# Patient Record
Sex: MALE | Race: WHITE | NOT HISPANIC OR LATINO
[De-identification: names, ages, dates, MRNs, and addresses within clinical notes are randomized per-mention and may not be internally consistent; named-entity substitution may affect disease eponyms.]

---

## 2017-01-11 ENCOUNTER — NON-APPOINTMENT (OUTPATIENT)
Age: 67
End: 2017-01-11

## 2017-01-11 ENCOUNTER — APPOINTMENT (OUTPATIENT)
Dept: CARDIOLOGY | Facility: CLINIC | Age: 67
End: 2017-01-11

## 2017-01-11 VITALS
DIASTOLIC BLOOD PRESSURE: 66 MMHG | WEIGHT: 142 LBS | HEIGHT: 66 IN | SYSTOLIC BLOOD PRESSURE: 177 MMHG | BODY MASS INDEX: 22.82 KG/M2

## 2017-01-11 DIAGNOSIS — I27.2 OTHER SECONDARY PULMONARY HYPERTENSION: ICD-10-CM

## 2017-01-11 DIAGNOSIS — Z87.19 PERSONAL HISTORY OF OTHER DISEASES OF THE DIGESTIVE SYSTEM: ICD-10-CM

## 2017-03-02 ENCOUNTER — INPATIENT (INPATIENT)
Facility: HOSPITAL | Age: 67
LOS: 4 days | Discharge: HOME | DRG: 682 | End: 2017-03-07
Attending: FAMILY MEDICINE | Admitting: FAMILY MEDICINE
Payer: MEDICARE

## 2017-03-02 VITALS
SYSTOLIC BLOOD PRESSURE: 222 MMHG | HEART RATE: 74 BPM | TEMPERATURE: 98 F | DIASTOLIC BLOOD PRESSURE: 78 MMHG | OXYGEN SATURATION: 100 % | RESPIRATION RATE: 16 BRPM | WEIGHT: 139.99 LBS

## 2017-03-02 DIAGNOSIS — Z98.89 OTHER SPECIFIED POSTPROCEDURAL STATES: Chronic | ICD-10-CM

## 2017-03-02 DIAGNOSIS — N17.9 ACUTE KIDNEY FAILURE, UNSPECIFIED: ICD-10-CM

## 2017-03-02 DIAGNOSIS — I10 ESSENTIAL (PRIMARY) HYPERTENSION: ICD-10-CM

## 2017-03-02 DIAGNOSIS — D64.9 ANEMIA, UNSPECIFIED: ICD-10-CM

## 2017-03-02 LAB
ALBUMIN SERPL ELPH-MCNC: 3.3 G/DL — SIGNIFICANT CHANGE UP (ref 3.3–5)
ALP SERPL-CCNC: 46 U/L — SIGNIFICANT CHANGE UP (ref 40–120)
ALT FLD-CCNC: 35 U/L — SIGNIFICANT CHANGE UP (ref 12–78)
ANION GAP SERPL CALC-SCNC: 14 MMOL/L — SIGNIFICANT CHANGE UP (ref 5–17)
APTT BLD: 24.3 SEC — LOW (ref 27.5–37.4)
AST SERPL-CCNC: 21 U/L — SIGNIFICANT CHANGE UP (ref 15–37)
BASOPHILS # BLD AUTO: 0 K/UL — SIGNIFICANT CHANGE UP (ref 0–0.2)
BASOPHILS NFR BLD AUTO: 0.4 % — SIGNIFICANT CHANGE UP (ref 0–2)
BILIRUB SERPL-MCNC: 0.3 MG/DL — SIGNIFICANT CHANGE UP (ref 0.2–1.2)
BUN SERPL-MCNC: 93 MG/DL — HIGH (ref 7–23)
CALCIUM SERPL-MCNC: 7.5 MG/DL — LOW (ref 8.5–10.1)
CHLORIDE SERPL-SCNC: 117 MMOL/L — HIGH (ref 96–108)
CO2 SERPL-SCNC: 13 MMOL/L — LOW (ref 22–31)
CREAT SERPL-MCNC: 5.1 MG/DL — HIGH (ref 0.5–1.3)
EOSINOPHIL # BLD AUTO: 0 K/UL — SIGNIFICANT CHANGE UP (ref 0–0.5)
EOSINOPHIL NFR BLD AUTO: 0 % — SIGNIFICANT CHANGE UP (ref 0–6)
FERRITIN SERPL-MCNC: 231.6 NG/ML — SIGNIFICANT CHANGE UP (ref 30–400)
GLUCOSE SERPL-MCNC: 106 MG/DL — HIGH (ref 70–99)
HBA1C BLD-MCNC: 5.5 % — SIGNIFICANT CHANGE UP (ref 4–5.6)
HCT VFR BLD CALC: 27.4 % — LOW (ref 39–50)
HGB BLD-MCNC: 8.8 G/DL — LOW (ref 13–17)
INR BLD: 0.9 RATIO — SIGNIFICANT CHANGE UP (ref 0.88–1.16)
IRON SATN MFR SERPL: 42 % — SIGNIFICANT CHANGE UP (ref 16–55)
IRON SATN MFR SERPL: 83 UG/DL — SIGNIFICANT CHANGE UP (ref 45–165)
LYMPHOCYTES # BLD AUTO: 1.5 K/UL — SIGNIFICANT CHANGE UP (ref 1–3.3)
LYMPHOCYTES # BLD AUTO: 13.2 % — SIGNIFICANT CHANGE UP (ref 13–44)
MCHC RBC-ENTMCNC: 30.5 PG — SIGNIFICANT CHANGE UP (ref 27–34)
MCHC RBC-ENTMCNC: 32.2 GM/DL — SIGNIFICANT CHANGE UP (ref 32–36)
MCV RBC AUTO: 95 FL — SIGNIFICANT CHANGE UP (ref 80–100)
MONOCYTES # BLD AUTO: 1 K/UL — HIGH (ref 0–0.9)
MONOCYTES NFR BLD AUTO: 8.6 % — SIGNIFICANT CHANGE UP (ref 1–9)
NEUTROPHILS # BLD AUTO: 8.6 K/UL — HIGH (ref 1.8–7.4)
NEUTROPHILS NFR BLD AUTO: 77.9 % — HIGH (ref 43–77)
PLATELET # BLD AUTO: 168 K/UL — SIGNIFICANT CHANGE UP (ref 150–400)
POTASSIUM SERPL-MCNC: 5.1 MMOL/L — SIGNIFICANT CHANGE UP (ref 3.5–5.3)
POTASSIUM SERPL-SCNC: 5.1 MMOL/L — SIGNIFICANT CHANGE UP (ref 3.5–5.3)
PROT SERPL-MCNC: 6.4 G/DL — SIGNIFICANT CHANGE UP (ref 6–8.3)
PROTHROM AB SERPL-ACNC: 10 SEC — SIGNIFICANT CHANGE UP (ref 10–13.1)
RBC # BLD: 2.89 M/UL — LOW (ref 4.2–5.8)
RBC # FLD: 15.5 % — HIGH (ref 10.3–14.5)
SODIUM SERPL-SCNC: 144 MMOL/L — SIGNIFICANT CHANGE UP (ref 135–145)
T3 SERPL-MCNC: 44 NG/DL — LOW (ref 80–200)
T4 AB SER-ACNC: 4.6 UG/DL — SIGNIFICANT CHANGE UP (ref 4.6–12)
TIBC SERPL-MCNC: 200 UG/DL — LOW (ref 220–430)
TSH SERPL-MCNC: 0.73 UIU/ML — SIGNIFICANT CHANGE UP (ref 0.36–3.74)
TSH SERPL-MCNC: 1.18 UIU/ML — SIGNIFICANT CHANGE UP (ref 0.36–3.74)
UIBC SERPL-MCNC: 117 UG/DL — SIGNIFICANT CHANGE UP (ref 110–370)
WBC # BLD: 11.1 K/UL — HIGH (ref 3.8–10.5)
WBC # FLD AUTO: 11.1 K/UL — HIGH (ref 3.8–10.5)

## 2017-03-02 PROCEDURE — 71010: CPT | Mod: 26

## 2017-03-02 PROCEDURE — 36556 INSERT NON-TUNNEL CV CATH: CPT

## 2017-03-02 PROCEDURE — 93010 ELECTROCARDIOGRAM REPORT: CPT

## 2017-03-02 PROCEDURE — 77001 FLUOROGUIDE FOR VEIN DEVICE: CPT | Mod: 26

## 2017-03-02 PROCEDURE — 99284 EMERGENCY DEPT VISIT MOD MDM: CPT

## 2017-03-02 PROCEDURE — 76937 US GUIDE VASCULAR ACCESS: CPT | Mod: 26

## 2017-03-02 RX ORDER — DOCUSATE SODIUM 100 MG
100 CAPSULE ORAL THREE TIMES A DAY
Qty: 0 | Refills: 0 | Status: DISCONTINUED | OUTPATIENT
Start: 2017-03-02 | End: 2017-03-07

## 2017-03-02 RX ORDER — CARVEDILOL PHOSPHATE 80 MG/1
6.25 CAPSULE, EXTENDED RELEASE ORAL EVERY 12 HOURS
Qty: 0 | Refills: 0 | Status: DISCONTINUED | OUTPATIENT
Start: 2017-03-02 | End: 2017-03-07

## 2017-03-02 RX ORDER — ERYTHROPOIETIN 10000 [IU]/ML
10000 INJECTION, SOLUTION INTRAVENOUS; SUBCUTANEOUS
Qty: 0 | Refills: 0 | Status: DISCONTINUED | OUTPATIENT
Start: 2017-03-02 | End: 2017-03-07

## 2017-03-02 RX ORDER — FOLIC ACID 0.8 MG
1 TABLET ORAL DAILY
Qty: 0 | Refills: 0 | Status: DISCONTINUED | OUTPATIENT
Start: 2017-03-02 | End: 2017-03-07

## 2017-03-02 RX ORDER — ALBUTEROL 90 UG/1
2 AEROSOL, METERED ORAL EVERY 6 HOURS
Qty: 0 | Refills: 0 | Status: DISCONTINUED | OUTPATIENT
Start: 2017-03-02 | End: 2017-03-07

## 2017-03-02 RX ORDER — LOSARTAN POTASSIUM 100 MG/1
25 TABLET, FILM COATED ORAL DAILY
Qty: 0 | Refills: 0 | Status: DISCONTINUED | OUTPATIENT
Start: 2017-03-02 | End: 2017-03-03

## 2017-03-02 RX ORDER — FERROUS SULFATE 325(65) MG
325 TABLET ORAL DAILY
Qty: 0 | Refills: 0 | Status: DISCONTINUED | OUTPATIENT
Start: 2017-03-02 | End: 2017-03-07

## 2017-03-02 RX ORDER — FLUTICASONE PROPIONATE AND SALMETEROL 50; 250 UG/1; UG/1
1 POWDER ORAL; RESPIRATORY (INHALATION)
Qty: 0 | Refills: 0 | Status: DISCONTINUED | OUTPATIENT
Start: 2017-03-02 | End: 2017-03-07

## 2017-03-02 RX ORDER — HYDRALAZINE HCL 50 MG
25 TABLET ORAL EVERY 8 HOURS
Qty: 0 | Refills: 0 | Status: DISCONTINUED | OUTPATIENT
Start: 2017-03-02 | End: 2017-03-06

## 2017-03-02 RX ORDER — CALCIUM ACETATE 667 MG
1334 TABLET ORAL
Qty: 0 | Refills: 0 | Status: DISCONTINUED | OUTPATIENT
Start: 2017-03-02 | End: 2017-03-07

## 2017-03-02 RX ORDER — PANTOPRAZOLE SODIUM 20 MG/1
40 TABLET, DELAYED RELEASE ORAL
Qty: 0 | Refills: 0 | Status: DISCONTINUED | OUTPATIENT
Start: 2017-03-02 | End: 2017-03-07

## 2017-03-02 RX ORDER — SIMVASTATIN 20 MG/1
20 TABLET, FILM COATED ORAL AT BEDTIME
Qty: 0 | Refills: 0 | Status: DISCONTINUED | OUTPATIENT
Start: 2017-03-02 | End: 2017-03-07

## 2017-03-02 RX ORDER — LOSARTAN POTASSIUM 100 MG/1
25 TABLET, FILM COATED ORAL DAILY
Qty: 0 | Refills: 0 | Status: DISCONTINUED | OUTPATIENT
Start: 2017-03-02 | End: 2017-03-02

## 2017-03-02 RX ORDER — HYDRALAZINE HCL 50 MG
10 TABLET ORAL EVERY 6 HOURS
Qty: 0 | Refills: 0 | Status: DISCONTINUED | OUTPATIENT
Start: 2017-03-02 | End: 2017-03-07

## 2017-03-02 RX ORDER — HYDRALAZINE HCL 50 MG
100 TABLET ORAL ONCE
Qty: 0 | Refills: 0 | Status: COMPLETED | OUTPATIENT
Start: 2017-03-02 | End: 2017-03-02

## 2017-03-02 RX ORDER — THIAMINE MONONITRATE (VIT B1) 100 MG
100 TABLET ORAL DAILY
Qty: 0 | Refills: 0 | Status: DISCONTINUED | OUTPATIENT
Start: 2017-03-02 | End: 2017-03-07

## 2017-03-02 RX ORDER — ISOSORBIDE MONONITRATE 60 MG/1
30 TABLET, EXTENDED RELEASE ORAL ONCE
Qty: 0 | Refills: 0 | Status: COMPLETED | OUTPATIENT
Start: 2017-03-02 | End: 2017-03-02

## 2017-03-02 RX ADMIN — Medication 0.3 MILLIGRAM(S): at 18:32

## 2017-03-02 RX ADMIN — Medication 100 MILLIGRAM(S): at 09:26

## 2017-03-02 RX ADMIN — SIMVASTATIN 20 MILLIGRAM(S): 20 TABLET, FILM COATED ORAL at 21:11

## 2017-03-02 RX ADMIN — Medication 0.3 MILLIGRAM(S): at 09:26

## 2017-03-02 RX ADMIN — ISOSORBIDE MONONITRATE 30 MILLIGRAM(S): 60 TABLET, EXTENDED RELEASE ORAL at 09:26

## 2017-03-02 RX ADMIN — Medication 25 MILLIGRAM(S): at 17:04

## 2017-03-02 RX ADMIN — FLUTICASONE PROPIONATE AND SALMETEROL 1 DOSE(S): 50; 250 POWDER ORAL; RESPIRATORY (INHALATION) at 21:12

## 2017-03-02 RX ADMIN — CARVEDILOL PHOSPHATE 6.25 MILLIGRAM(S): 80 CAPSULE, EXTENDED RELEASE ORAL at 17:01

## 2017-03-02 NOTE — H&P ADULT. - PMH
CAD (coronary artery disease)    COPD (chronic obstructive pulmonary disease)    GERD (gastroesophageal reflux disease)    HTN (hypertension)    Nicotine addiction    Pancreatitis    Renal failure  with dialysis in the past for Acute RF  Suicidal overdose  Anti-freeze - 7 years ago Anemia    CAD (coronary artery disease)    Constipation    COPD (chronic obstructive pulmonary disease)    Dizziness    GERD (gastroesophageal reflux disease)    HTN (hypertension)    Hyperlipidemia    Nicotine addiction    Pancreatitis    Renal failure  with dialysis in the past for Acute RF  Suicidal overdose  Anti-freeze - 7 years ago

## 2017-03-02 NOTE — DIETITIAN INITIAL EVALUATION ADULT. - OTHER INFO
Pt A+O at visit; wife at bedside. Dx Acute renal failure on CKD. Plan for HD catheter placement today. NPO. Pta follow low Na, low K, low phos diet pta. Fair appetite/intake pta. Wt stable (135-140lbs). No GI issues at present. +BM 3/2.

## 2017-03-02 NOTE — ED ADULT NURSE NOTE - OBJECTIVE STATEMENT
Presents to ER for elevated BUN/Creatinine. Pt has a history of renal failure, was on HD years ago. As per wife at bedside, states pt's creatinine is usually around 2-3, and Dr Dominguez has been monitoring it for years.  Pt had recent blood work which showed a BUN of 109 and a Creatinine of 5.83. Presents to ER for elevated BUN/Creatinine. Pt has a history of renal failure, was on HD years ago. As per wife at bedside, states pt's creatinine is usually around 2-3, and Dr Dominguez has been monitoring it for years.  Pt had recent blood work which showed a BUN of 109 and a Creatinine of 5.83.  Pt states he still voids.

## 2017-03-02 NOTE — ED ADULT NURSE NOTE - PMH
CAD (coronary artery disease)    COPD (chronic obstructive pulmonary disease)    GERD (gastroesophageal reflux disease)    HTN (hypertension)    Nicotine addiction    Pancreatitis    Renal failure  with dialysis in the past for Acute RF  Suicidal overdose  Anti-freeze - 7 years ago

## 2017-03-02 NOTE — DIETITIAN INITIAL EVALUATION ADULT. - NUTRITION INTERVENTION
Vitamin/Meals and Snack/Nutrition - Related Medication Management/Nutrition Education/Medical Food Supplements

## 2017-03-02 NOTE — ED PROVIDER NOTE - OBJECTIVE STATEMENT
67 y/o m with hx of HTN, HLD, COPD, LAD stent, dialysis 2006 x 4 mos sent in by Nephrologist, Dr. Kan, for elevated BUN/Cr and possible Dialysis.  pt states he has been fatigued with recent PNA.  Pt denies any fevers, chills, chest pain, SOB. BUN/Cr 109/5.83  PCP: Penelope  cardio: Ruthy

## 2017-03-02 NOTE — ED ADULT NURSE REASSESSMENT NOTE - NS ED NURSE REASSESS COMMENT FT1
Call placed to Dr Obando regarding STAT occult blood stool sample.  Dr Hernadez aware of order, states pt does not need guaiac. Awaiting callback.

## 2017-03-02 NOTE — H&P ADULT. - HISTORY OF PRESENT ILLNESS
presented to er after patient was seen by renal for worsening creatinine and sent in for HD.  Patient has been feeling tired lately.  Denies any other associated symptoms.  History of similar previous episodes and had HD.  Patient is being admitted for HD catheter placement.

## 2017-03-02 NOTE — PATIENT PROFILE ADULT. - EXTENSIONS OF SELF_ADULT
Dentures/Eyeglasses/Hearing Aid Dentures/Wheel Chair/Eyeglasses/Hearing Aid Hearing Aid/Eyeglasses/Dentures

## 2017-03-02 NOTE — PATIENT PROFILE ADULT. - ABILITY TO HEAR (WITH HEARING AID OR HEARING APPLIANCE IF NORMALLY USED):
[place]/Mildly to Moderately Impaired: difficulty hearing in some environments or speaker may need to increase volume or speak distinctly Adequate: hears normal conversation without difficulty/[place]

## 2017-03-02 NOTE — DIETITIAN INITIAL EVALUATION ADULT. - NS AS NUTRI INTERV ED CONTENT
Priority modifications/Purpose of the nutrition education/Written and verbal instructions provided on above diet. Pt/pts wife with good understanding. RDs name/phone number left with patient if questions/concerns arise.

## 2017-03-03 LAB
ANION GAP SERPL CALC-SCNC: 12 MMOL/L — SIGNIFICANT CHANGE UP (ref 5–17)
BLD GP AB SCN SERPL QL: SIGNIFICANT CHANGE UP
BUN SERPL-MCNC: 51 MG/DL — HIGH (ref 7–23)
CALCIUM SERPL-MCNC: 7.4 MG/DL — LOW (ref 8.5–10.1)
CHLORIDE SERPL-SCNC: 107 MMOL/L — SIGNIFICANT CHANGE UP (ref 96–108)
CO2 SERPL-SCNC: 23 MMOL/L — SIGNIFICANT CHANGE UP (ref 22–31)
CREAT SERPL-MCNC: 3.7 MG/DL — HIGH (ref 0.5–1.3)
GLUCOSE SERPL-MCNC: 144 MG/DL — HIGH (ref 70–99)
HBV CORE AB SER-ACNC: SIGNIFICANT CHANGE UP
HBV SURFACE AB SER-ACNC: <3 MIU/ML — LOW
HBV SURFACE AB SER-ACNC: SIGNIFICANT CHANGE UP
HBV SURFACE AG SER-ACNC: SIGNIFICANT CHANGE UP
HCT VFR BLD CALC: 23.2 % — LOW (ref 39–50)
HCV AB S/CO SERPL IA: 0.21 S/CO — SIGNIFICANT CHANGE UP
HCV AB SERPL-IMP: SIGNIFICANT CHANGE UP
HGB BLD-MCNC: 7.6 G/DL — LOW (ref 13–17)
MCHC RBC-ENTMCNC: 30.5 PG — SIGNIFICANT CHANGE UP (ref 27–34)
MCHC RBC-ENTMCNC: 32.7 GM/DL — SIGNIFICANT CHANGE UP (ref 32–36)
MCV RBC AUTO: 93.3 FL — SIGNIFICANT CHANGE UP (ref 80–100)
OB PNL STL: POSITIVE
PLATELET # BLD AUTO: 122 K/UL — LOW (ref 150–400)
POTASSIUM SERPL-MCNC: 3.9 MMOL/L — SIGNIFICANT CHANGE UP (ref 3.5–5.3)
POTASSIUM SERPL-SCNC: 3.9 MMOL/L — SIGNIFICANT CHANGE UP (ref 3.5–5.3)
RBC # BLD: 2.48 M/UL — LOW (ref 4.2–5.8)
RBC # FLD: 15.2 % — HIGH (ref 10.3–14.5)
SODIUM SERPL-SCNC: 142 MMOL/L — SIGNIFICANT CHANGE UP (ref 135–145)
WBC # BLD: 8.5 K/UL — SIGNIFICANT CHANGE UP (ref 3.8–10.5)
WBC # FLD AUTO: 8.5 K/UL — SIGNIFICANT CHANGE UP (ref 3.8–10.5)

## 2017-03-03 RX ORDER — ERYTHROPOIETIN 10000 [IU]/ML
10000 INJECTION, SOLUTION INTRAVENOUS; SUBCUTANEOUS ONCE
Qty: 0 | Refills: 0 | Status: COMPLETED | OUTPATIENT
Start: 2017-03-03 | End: 2017-03-03

## 2017-03-03 RX ORDER — LOSARTAN POTASSIUM 100 MG/1
50 TABLET, FILM COATED ORAL DAILY
Qty: 0 | Refills: 0 | Status: DISCONTINUED | OUTPATIENT
Start: 2017-03-03 | End: 2017-03-06

## 2017-03-03 RX ADMIN — SIMVASTATIN 20 MILLIGRAM(S): 20 TABLET, FILM COATED ORAL at 22:04

## 2017-03-03 RX ADMIN — Medication 0.3 MILLIGRAM(S): at 14:04

## 2017-03-03 RX ADMIN — Medication 25 MILLIGRAM(S): at 22:04

## 2017-03-03 RX ADMIN — Medication 0.3 MILLIGRAM(S): at 08:46

## 2017-03-03 RX ADMIN — FLUTICASONE PROPIONATE AND SALMETEROL 1 DOSE(S): 50; 250 POWDER ORAL; RESPIRATORY (INHALATION) at 19:16

## 2017-03-03 RX ADMIN — Medication 1334 MILLIGRAM(S): at 19:16

## 2017-03-03 RX ADMIN — Medication 25 MILLIGRAM(S): at 06:37

## 2017-03-03 RX ADMIN — Medication 0.3 MILLIGRAM(S): at 00:42

## 2017-03-03 RX ADMIN — Medication 325 MILLIGRAM(S): at 12:23

## 2017-03-03 RX ADMIN — LOSARTAN POTASSIUM 50 MILLIGRAM(S): 100 TABLET, FILM COATED ORAL at 08:47

## 2017-03-03 RX ADMIN — Medication 0.3 MILLIGRAM(S): at 22:04

## 2017-03-03 RX ADMIN — Medication 100 MILLIGRAM(S): at 12:22

## 2017-03-03 RX ADMIN — Medication 1334 MILLIGRAM(S): at 12:22

## 2017-03-03 RX ADMIN — ERYTHROPOIETIN 10000 UNIT(S): 10000 INJECTION, SOLUTION INTRAVENOUS; SUBCUTANEOUS at 17:13

## 2017-03-03 RX ADMIN — Medication 100 MILLIGRAM(S): at 14:04

## 2017-03-03 RX ADMIN — Medication 1 MILLIGRAM(S): at 12:23

## 2017-03-03 RX ADMIN — PANTOPRAZOLE SODIUM 40 MILLIGRAM(S): 20 TABLET, DELAYED RELEASE ORAL at 06:37

## 2017-03-03 RX ADMIN — Medication 25 MILLIGRAM(S): at 14:04

## 2017-03-03 RX ADMIN — CARVEDILOL PHOSPHATE 6.25 MILLIGRAM(S): 80 CAPSULE, EXTENDED RELEASE ORAL at 19:16

## 2017-03-03 RX ADMIN — Medication 100 MILLIGRAM(S): at 22:04

## 2017-03-03 RX ADMIN — Medication 1334 MILLIGRAM(S): at 08:45

## 2017-03-03 NOTE — GOALS OF CARE CONVERSATION - PERSONAL ADVANCE DIRECTIVE - CONVERSATION DETAILS
met pt, has no hcp, form reviewed, simple lay terms, cpr dnr dni sonia, pt has not had conversation w family, wife is his hcp and will show form to her, available to further discuss. contact # given. at present pt is full code.

## 2017-03-04 ENCOUNTER — TRANSCRIPTION ENCOUNTER (OUTPATIENT)
Age: 67
End: 2017-03-04

## 2017-03-04 LAB
ALBUMIN SERPL ELPH-MCNC: 2.3 G/DL — LOW (ref 3.3–5)
ANION GAP SERPL CALC-SCNC: 10 MMOL/L — SIGNIFICANT CHANGE UP (ref 5–17)
BUN SERPL-MCNC: 28 MG/DL — HIGH (ref 7–23)
CALCIUM SERPL-MCNC: 7.2 MG/DL — LOW (ref 8.5–10.1)
CHLORIDE SERPL-SCNC: 102 MMOL/L — SIGNIFICANT CHANGE UP (ref 96–108)
CO2 SERPL-SCNC: 27 MMOL/L — SIGNIFICANT CHANGE UP (ref 22–31)
CREAT SERPL-MCNC: 3 MG/DL — HIGH (ref 0.5–1.3)
GLUCOSE SERPL-MCNC: 154 MG/DL — HIGH (ref 70–99)
HCT VFR BLD CALC: 23.4 % — LOW (ref 39–50)
HGB BLD-MCNC: 7.7 G/DL — LOW (ref 13–17)
MCHC RBC-ENTMCNC: 30 PG — SIGNIFICANT CHANGE UP (ref 27–34)
MCHC RBC-ENTMCNC: 33 GM/DL — SIGNIFICANT CHANGE UP (ref 32–36)
MCV RBC AUTO: 91 FL — SIGNIFICANT CHANGE UP (ref 80–100)
PHOSPHATE SERPL-MCNC: 2.1 MG/DL — LOW (ref 2.5–4.5)
PLATELET # BLD AUTO: 111 K/UL — LOW (ref 150–400)
POTASSIUM SERPL-MCNC: 3.8 MMOL/L — SIGNIFICANT CHANGE UP (ref 3.5–5.3)
POTASSIUM SERPL-SCNC: 3.8 MMOL/L — SIGNIFICANT CHANGE UP (ref 3.5–5.3)
RBC # BLD: 2.57 M/UL — LOW (ref 4.2–5.8)
RBC # FLD: 14.8 % — HIGH (ref 10.3–14.5)
SODIUM SERPL-SCNC: 139 MMOL/L — SIGNIFICANT CHANGE UP (ref 135–145)
WBC # BLD: 10.6 K/UL — HIGH (ref 3.8–10.5)
WBC # FLD AUTO: 10.6 K/UL — HIGH (ref 3.8–10.5)

## 2017-03-04 RX ADMIN — Medication 1334 MILLIGRAM(S): at 17:51

## 2017-03-04 RX ADMIN — Medication 25 MILLIGRAM(S): at 06:24

## 2017-03-04 RX ADMIN — FLUTICASONE PROPIONATE AND SALMETEROL 1 DOSE(S): 50; 250 POWDER ORAL; RESPIRATORY (INHALATION) at 08:43

## 2017-03-04 RX ADMIN — Medication 100 MILLIGRAM(S): at 14:34

## 2017-03-04 RX ADMIN — FLUTICASONE PROPIONATE AND SALMETEROL 1 DOSE(S): 50; 250 POWDER ORAL; RESPIRATORY (INHALATION) at 17:52

## 2017-03-04 RX ADMIN — ERYTHROPOIETIN 10000 UNIT(S): 10000 INJECTION, SOLUTION INTRAVENOUS; SUBCUTANEOUS at 11:13

## 2017-03-04 RX ADMIN — Medication 325 MILLIGRAM(S): at 14:34

## 2017-03-04 RX ADMIN — SIMVASTATIN 20 MILLIGRAM(S): 20 TABLET, FILM COATED ORAL at 21:22

## 2017-03-04 RX ADMIN — Medication 1 MILLIGRAM(S): at 14:34

## 2017-03-04 RX ADMIN — Medication 100 MILLIGRAM(S): at 21:19

## 2017-03-04 RX ADMIN — Medication 0.3 MILLIGRAM(S): at 06:23

## 2017-03-04 RX ADMIN — LOSARTAN POTASSIUM 50 MILLIGRAM(S): 100 TABLET, FILM COATED ORAL at 06:23

## 2017-03-04 RX ADMIN — CARVEDILOL PHOSPHATE 6.25 MILLIGRAM(S): 80 CAPSULE, EXTENDED RELEASE ORAL at 06:23

## 2017-03-04 RX ADMIN — PANTOPRAZOLE SODIUM 40 MILLIGRAM(S): 20 TABLET, DELAYED RELEASE ORAL at 06:23

## 2017-03-04 RX ADMIN — Medication 25 MILLIGRAM(S): at 14:34

## 2017-03-04 RX ADMIN — Medication 25 MILLIGRAM(S): at 21:19

## 2017-03-04 RX ADMIN — Medication 0.3 MILLIGRAM(S): at 14:34

## 2017-03-04 RX ADMIN — Medication 0.3 MILLIGRAM(S): at 21:19

## 2017-03-04 RX ADMIN — Medication 1334 MILLIGRAM(S): at 08:43

## 2017-03-04 RX ADMIN — CARVEDILOL PHOSPHATE 6.25 MILLIGRAM(S): 80 CAPSULE, EXTENDED RELEASE ORAL at 17:51

## 2017-03-04 RX ADMIN — Medication 100 MILLIGRAM(S): at 06:23

## 2017-03-04 NOTE — DISCHARGE NOTE ADULT - CARE PROVIDER_API CALL
Reese Clayton (DO), Medicine  [address]  [place] [address]  Phone: [phone]  Fax: [phone]    Prudencio Ware (MD), Medicine  [address]  [place] [address]  Phone: [phone]  Fax: [phone]    Jakub Cheung (MD), Cardiovascular Disease  [address] [place] [address]  Phone: [phone]  Fax: [phone]    Walt Jauregui (), Gastroenterology; Internal Medicine  [address] [place] [address]  Phone: [phone]  Fax: [phone]

## 2017-03-04 NOTE — DISCHARGE NOTE ADULT - MEDICATION SUMMARY - MEDICATIONS TO STOP TAKING
I will STOP taking the medications listed below when I get home from the hospital:    Plavix 75 mg oral tablet  -- 1 tab(s) by mouth once a day - Hospital    lactobacillus acidophilus - oral tablet  -- 1 tab(s) by mouth once a day - hospital    thiamine 100 mg oral tablet  -- 1 tab(s) by mouth once a day - hospital    Toprol-XL 50 mg oral tablet, extended release  -- 1 tab(s) by mouth once a day    Norvasc 5 mg oral tablet  -- 1 tab(s) by mouth every 12 hours    Innerclean oral tablet  -- 2 tab(s) by mouth once a day (at bedtime)    Antivert 12.5 mg oral tablet  -- 1 tab(s) by mouth 3 times a day  -- May cause drowsiness.  Alcohol may intensify this effect.  Use care when operating dangerous machinery.    Norvasc 10 mg oral tablet  -- 1 tab(s) by mouth once a day    plavix  -- 75 milligram(s) by mouth once a day

## 2017-03-04 NOTE — DISCHARGE NOTE ADULT - SECONDARY DIAGNOSIS.
Anemia CAD (coronary artery disease) COPD (chronic obstructive pulmonary disease) GERD (gastroesophageal reflux disease) HTN (hypertension) Hyperlipidemia

## 2017-03-04 NOTE — DISCHARGE NOTE ADULT - PATIENT PORTAL LINK FT
“You can access the FollowHealth Patient Portal, offered by [facility], by registering with the following website: [email or web address]”

## 2017-03-04 NOTE — DISCHARGE NOTE ADULT - MEDICATION SUMMARY - MEDICATIONS TO TAKE
I will START or STAY ON the medications listed below when I get home from the hospital:    Bystolic  -- 5 milligram(s) by mouth in the morning and at bedtime  -- Indication: For HTN (hypertension)    aspirin 81 mg oral delayed release tablet  -- 1 tab(s) by mouth once a day  -- Indication: For CAD (coronary artery disease)    losartan 50 mg oral tablet  -- 1 tab(s) by mouth 2 times a day  -- Indication: For HTN (hypertension)    Catapres 0.2 mg oral tablet  -- 1 tab(s) by mouth 3 times a day  -- Indication: For HTN (hypertension)    Imdur 30 mg oral tablet, extended release  -- 1 tab(s) by mouth once a day (in the morning) - Hospital  -- Indication: For CAD (coronary artery disease)    Zocor 20 mg oral tablet  -- 1 tab(s) by mouth once a day (at bedtime)  -- Indication: For Hyperlipidemia    Advair Diskus 250 mcg-50 mcg inhalation powder  -- 1 puff(s) inhaled 2 times a day  -- Indication: For COPD (chronic obstructive pulmonary disease)    albuterol 90 mcg/inh inhalation aerosol  -- 2 puff(s) inhaled every 6 hours, As needed, Shortness of Breath and/or Wheezing  -- Indication: For COPD (chronic obstructive pulmonary disease)    epoetin carlin  --  subcutaneous 3 times a week per Dr. KOWALSKI  -- Indication: For Anemia    Feosol 325 mg (65 mg elemental iron) oral tablet  -- 1 milligram(s) by mouth once a day  -- Indication: For Anemia    docusate sodium 100 mg oral capsule  -- 1 cap(s) by mouth 3 times a day  -- Indication: For Constipation    calcium acetate 667 mg oral tablet  -- 1 tab(s) by mouth 3 times a day  -- Indication: For ESRD (end stage renal disease)    pantoprazole 40 mg oral delayed release tablet  -- 1 tab(s) by mouth once a day (before a meal)  -- Indication: For GERD (gastroesophageal reflux disease)    hydrALAZINE 100 mg oral tablet  -- 1 tab(s) by mouth every 8 hours  -- Indication: For HTN (hypertension)    thiamine 100 mg oral tablet  -- 1 tab(s) by mouth once a day - [facility]  -- Indication: For suplement    folic acid 1 mg oral tablet  -- 1 tab(s) by mouth once a day - hospital  -- Indication: For suplement I will START or STAY ON the medications listed below when I get home from the hospital:    Bystolic  -- 5 milligram(s) by mouth in the morning and at bedtime  -- Indication: For HTN (hypertension)    aspirin 81 mg oral delayed release tablet  -- 1 tab(s) by mouth once a day  -- Indication: For CAD (coronary artery disease)    losartan 50 mg oral tablet  -- 1 tab(s) by mouth 2 times a day  -- Indication: For HTN (hypertension)    Catapres 0.2 mg oral tablet  -- 1 tab(s) by mouth 3 times a day  -- Indication: For HTN (hypertension)    Imdur 30 mg oral tablet, extended release  -- 1 tab(s) by mouth once a day (in the morning) - [facility]  -- Indication: For CAD (coronary artery disease)    Zocor 20 mg oral tablet  -- 1 tab(s) by mouth once a day (at bedtime)  -- Indication: For Hyperlipidemia    Advair Diskus 250 mcg-50 mcg inhalation powder  -- 1 puff(s) inhaled 2 times a day  -- Indication: For COPD (chronic obstructive pulmonary disease)    albuterol 90 mcg/inh inhalation aerosol  -- 2 puff(s) inhaled every 6 hours, As needed, Shortness of Breath and/or Wheezing  -- Indication: For COPD (chronic obstructive pulmonary disease)    epoetin carlin  --  subcutaneous 3 times a week per Dr. KOWALSKI  -- Indication: For Anemia    Feosol 325 mg (65 mg elemental iron) oral tablet  -- 1 milligram(s) by mouth once a day  -- Indication: For Anemia    docusate sodium 100 mg oral capsule  -- 1 cap(s) by mouth 3 times a day  -- Indication: For Constipation    calcium acetate 667 mg oral tablet  -- 1 tab(s) by mouth 3 times a day  -- Indication: For ESRD (end stage renal disease)    pantoprazole 40 mg oral delayed release tablet  -- 1 tab(s) by mouth once a day (before a meal)  -- Indication: For GERD (gastroesophageal reflux disease)    hydrALAZINE 100 mg oral tablet  -- 1 tab(s) by mouth every 8 hours  -- Indication: For HTN (hypertension)    multivitamin  -- 1 cap(s) by mouth once a day  -- Indication: For suplement

## 2017-03-04 NOTE — DISCHARGE NOTE ADULT - CARE PROVIDERS DIRECT ADDRESSES
,DirectAddress_Unknown,DirectAddress_Unknown,[email or web address],DirectAddress_Unknown,DirectAddress_Unknown

## 2017-03-04 NOTE — DISCHARGE NOTE ADULT - HOSPITAL COURSE
admitted for ESRD - for HD placement  underwent TEMP cath initially, later PERMACATH for HD  anemia - on procrit  has Guaic positive - refused GI work up  DC after RENAL and GI clearance

## 2017-03-04 NOTE — DISCHARGE NOTE ADULT - CARE PLAN
Principal Discharge DX:	ESRD (end stage renal disease)  Goal:	better kidney function  Instructions for follow-up, activity and diet:	follow up with RENAL Dr. KOWALSKI  Secondary Diagnosis:	Anemia  Secondary Diagnosis:	CAD (coronary artery disease)  Secondary Diagnosis:	COPD (chronic obstructive pulmonary disease)  Secondary Diagnosis:	GERD (gastroesophageal reflux disease)  Secondary Diagnosis:	HTN (hypertension)  Secondary Diagnosis:	Hyperlipidemia

## 2017-03-05 LAB
APPEARANCE UR: CLEAR — SIGNIFICANT CHANGE UP
BACTERIA # UR AUTO: NEGATIVE — SIGNIFICANT CHANGE UP
BILIRUB UR-MCNC: NEGATIVE — SIGNIFICANT CHANGE UP
COLOR SPEC: YELLOW — SIGNIFICANT CHANGE UP
DIFF PNL FLD: NEGATIVE — SIGNIFICANT CHANGE UP
EPI CELLS # UR: SIGNIFICANT CHANGE UP
GLUCOSE UR QL: 100 MG/DL
HCT VFR BLD CALC: 22.8 % — LOW (ref 39–50)
HGB BLD-MCNC: 7.5 G/DL — LOW (ref 13–17)
KETONES UR-MCNC: NEGATIVE — SIGNIFICANT CHANGE UP
LEUKOCYTE ESTERASE UR-ACNC: NEGATIVE — SIGNIFICANT CHANGE UP
NITRITE UR-MCNC: NEGATIVE — SIGNIFICANT CHANGE UP
PH UR: 8 — SIGNIFICANT CHANGE UP (ref 4.8–8)
PROT UR-MCNC: 500 MG/DL
RBC CASTS # UR COMP ASSIST: SIGNIFICANT CHANGE UP /HPF (ref 0–4)
SP GR SPEC: 1.01 — SIGNIFICANT CHANGE UP (ref 1.01–1.02)
UROBILINOGEN FLD QL: NEGATIVE — SIGNIFICANT CHANGE UP
WBC UR QL: SIGNIFICANT CHANGE UP

## 2017-03-05 RX ADMIN — CARVEDILOL PHOSPHATE 6.25 MILLIGRAM(S): 80 CAPSULE, EXTENDED RELEASE ORAL at 06:26

## 2017-03-05 RX ADMIN — Medication 0.3 MILLIGRAM(S): at 21:30

## 2017-03-05 RX ADMIN — Medication 0.3 MILLIGRAM(S): at 14:03

## 2017-03-05 RX ADMIN — Medication 25 MILLIGRAM(S): at 06:26

## 2017-03-05 RX ADMIN — Medication 1334 MILLIGRAM(S): at 12:09

## 2017-03-05 RX ADMIN — Medication 1334 MILLIGRAM(S): at 18:31

## 2017-03-05 RX ADMIN — PANTOPRAZOLE SODIUM 40 MILLIGRAM(S): 20 TABLET, DELAYED RELEASE ORAL at 06:26

## 2017-03-05 RX ADMIN — Medication 100 MILLIGRAM(S): at 14:03

## 2017-03-05 RX ADMIN — Medication 100 MILLIGRAM(S): at 12:09

## 2017-03-05 RX ADMIN — FLUTICASONE PROPIONATE AND SALMETEROL 1 DOSE(S): 50; 250 POWDER ORAL; RESPIRATORY (INHALATION) at 18:31

## 2017-03-05 RX ADMIN — FLUTICASONE PROPIONATE AND SALMETEROL 1 DOSE(S): 50; 250 POWDER ORAL; RESPIRATORY (INHALATION) at 08:18

## 2017-03-05 RX ADMIN — Medication 1 MILLIGRAM(S): at 12:09

## 2017-03-05 RX ADMIN — LOSARTAN POTASSIUM 50 MILLIGRAM(S): 100 TABLET, FILM COATED ORAL at 06:26

## 2017-03-05 RX ADMIN — Medication 25 MILLIGRAM(S): at 14:03

## 2017-03-05 RX ADMIN — Medication 325 MILLIGRAM(S): at 12:09

## 2017-03-05 RX ADMIN — Medication 1334 MILLIGRAM(S): at 09:05

## 2017-03-05 RX ADMIN — SIMVASTATIN 20 MILLIGRAM(S): 20 TABLET, FILM COATED ORAL at 21:29

## 2017-03-05 RX ADMIN — Medication 0.3 MILLIGRAM(S): at 06:26

## 2017-03-05 RX ADMIN — CARVEDILOL PHOSPHATE 6.25 MILLIGRAM(S): 80 CAPSULE, EXTENDED RELEASE ORAL at 18:31

## 2017-03-05 RX ADMIN — Medication 100 MILLIGRAM(S): at 21:30

## 2017-03-05 RX ADMIN — Medication 25 MILLIGRAM(S): at 21:30

## 2017-03-05 RX ADMIN — Medication 100 MILLIGRAM(S): at 06:26

## 2017-03-06 LAB
BLD GP AB SCN SERPL QL: SIGNIFICANT CHANGE UP
HCT VFR BLD CALC: 25.2 % — LOW (ref 39–50)
HCT VFR BLD CALC: 27.8 % — LOW (ref 39–50)
HGB BLD-MCNC: 8.1 G/DL — LOW (ref 13–17)
HGB BLD-MCNC: 8.9 G/DL — LOW (ref 13–17)

## 2017-03-06 PROCEDURE — 36558 INSERT TUNNELED CV CATH: CPT

## 2017-03-06 PROCEDURE — 77001 FLUOROGUIDE FOR VEIN DEVICE: CPT | Mod: 26

## 2017-03-06 RX ORDER — LOSARTAN POTASSIUM 100 MG/1
50 TABLET, FILM COATED ORAL
Qty: 0 | Refills: 0 | Status: DISCONTINUED | OUTPATIENT
Start: 2017-03-06 | End: 2017-03-07

## 2017-03-06 RX ORDER — ASPIRIN/CALCIUM CARB/MAGNESIUM 324 MG
81 TABLET ORAL DAILY
Qty: 0 | Refills: 0 | Status: DISCONTINUED | OUTPATIENT
Start: 2017-03-06 | End: 2017-03-06

## 2017-03-06 RX ORDER — ASPIRIN/CALCIUM CARB/MAGNESIUM 324 MG
325 TABLET ORAL DAILY
Qty: 0 | Refills: 0 | Status: DISCONTINUED | OUTPATIENT
Start: 2017-03-06 | End: 2017-03-07

## 2017-03-06 RX ORDER — HYDRALAZINE HCL 50 MG
50 TABLET ORAL EVERY 8 HOURS
Qty: 0 | Refills: 0 | Status: DISCONTINUED | OUTPATIENT
Start: 2017-03-06 | End: 2017-03-07

## 2017-03-06 RX ADMIN — LOSARTAN POTASSIUM 50 MILLIGRAM(S): 100 TABLET, FILM COATED ORAL at 18:11

## 2017-03-06 RX ADMIN — Medication 0.2 MILLIGRAM(S): at 14:29

## 2017-03-06 RX ADMIN — Medication 100 MILLIGRAM(S): at 14:29

## 2017-03-06 RX ADMIN — Medication 25 MILLIGRAM(S): at 06:07

## 2017-03-06 RX ADMIN — Medication 0.2 MILLIGRAM(S): at 22:19

## 2017-03-06 RX ADMIN — CARVEDILOL PHOSPHATE 6.25 MILLIGRAM(S): 80 CAPSULE, EXTENDED RELEASE ORAL at 18:11

## 2017-03-06 RX ADMIN — Medication 100 MILLIGRAM(S): at 06:08

## 2017-03-06 RX ADMIN — FLUTICASONE PROPIONATE AND SALMETEROL 1 DOSE(S): 50; 250 POWDER ORAL; RESPIRATORY (INHALATION) at 06:13

## 2017-03-06 RX ADMIN — Medication 50 MILLIGRAM(S): at 14:29

## 2017-03-06 RX ADMIN — PANTOPRAZOLE SODIUM 40 MILLIGRAM(S): 20 TABLET, DELAYED RELEASE ORAL at 06:09

## 2017-03-06 RX ADMIN — Medication 1334 MILLIGRAM(S): at 18:11

## 2017-03-06 RX ADMIN — SIMVASTATIN 20 MILLIGRAM(S): 20 TABLET, FILM COATED ORAL at 22:19

## 2017-03-06 RX ADMIN — Medication 81 MILLIGRAM(S): at 12:53

## 2017-03-06 RX ADMIN — Medication 50 MILLIGRAM(S): at 22:19

## 2017-03-06 RX ADMIN — Medication 100 MILLIGRAM(S): at 22:19

## 2017-03-07 VITALS
TEMPERATURE: 98 F | SYSTOLIC BLOOD PRESSURE: 199 MMHG | OXYGEN SATURATION: 98 % | RESPIRATION RATE: 18 BRPM | DIASTOLIC BLOOD PRESSURE: 78 MMHG | HEART RATE: 73 BPM

## 2017-03-07 LAB
ANION GAP SERPL CALC-SCNC: 11 MMOL/L — SIGNIFICANT CHANGE UP (ref 5–17)
BUN SERPL-MCNC: 72 MG/DL — HIGH (ref 7–23)
CALCIUM SERPL-MCNC: 7.6 MG/DL — LOW (ref 8.5–10.1)
CHLORIDE SERPL-SCNC: 102 MMOL/L — SIGNIFICANT CHANGE UP (ref 96–108)
CO2 SERPL-SCNC: 24 MMOL/L — SIGNIFICANT CHANGE UP (ref 22–31)
CREAT SERPL-MCNC: 4.5 MG/DL — HIGH (ref 0.5–1.3)
GLUCOSE SERPL-MCNC: 102 MG/DL — HIGH (ref 70–99)
HCT VFR BLD CALC: 22.6 % — LOW (ref 39–50)
HGB BLD-MCNC: 7.4 G/DL — LOW (ref 13–17)
MCHC RBC-ENTMCNC: 30.4 PG — SIGNIFICANT CHANGE UP (ref 27–34)
MCHC RBC-ENTMCNC: 32.9 GM/DL — SIGNIFICANT CHANGE UP (ref 32–36)
MCV RBC AUTO: 92.2 FL — SIGNIFICANT CHANGE UP (ref 80–100)
PLATELET # BLD AUTO: 128 K/UL — LOW (ref 150–400)
POTASSIUM SERPL-MCNC: 4.5 MMOL/L — SIGNIFICANT CHANGE UP (ref 3.5–5.3)
POTASSIUM SERPL-SCNC: 4.5 MMOL/L — SIGNIFICANT CHANGE UP (ref 3.5–5.3)
RBC # BLD: 2.45 M/UL — LOW (ref 4.2–5.8)
RBC # FLD: 15.4 % — HIGH (ref 10.3–14.5)
SODIUM SERPL-SCNC: 137 MMOL/L — SIGNIFICANT CHANGE UP (ref 135–145)
WBC # BLD: 12.1 K/UL — HIGH (ref 3.8–10.5)
WBC # FLD AUTO: 12.1 K/UL — HIGH (ref 3.8–10.5)

## 2017-03-07 PROCEDURE — 83036 HEMOGLOBIN GLYCOSYLATED A1C: CPT

## 2017-03-07 PROCEDURE — 86803 HEPATITIS C AB TEST: CPT

## 2017-03-07 PROCEDURE — 77001 FLUOROGUIDE FOR VEIN DEVICE: CPT

## 2017-03-07 PROCEDURE — 99285 EMERGENCY DEPT VISIT HI MDM: CPT | Mod: 25

## 2017-03-07 PROCEDURE — C1769: CPT

## 2017-03-07 PROCEDURE — 83550 IRON BINDING TEST: CPT

## 2017-03-07 PROCEDURE — 85730 THROMBOPLASTIN TIME PARTIAL: CPT

## 2017-03-07 PROCEDURE — C1894: CPT

## 2017-03-07 PROCEDURE — 80074 ACUTE HEPATITIS PANEL: CPT

## 2017-03-07 PROCEDURE — 84436 ASSAY OF TOTAL THYROXINE: CPT

## 2017-03-07 PROCEDURE — 71045 X-RAY EXAM CHEST 1 VIEW: CPT

## 2017-03-07 PROCEDURE — 86706 HEP B SURFACE ANTIBODY: CPT

## 2017-03-07 PROCEDURE — 82728 ASSAY OF FERRITIN: CPT

## 2017-03-07 PROCEDURE — 86850 RBC ANTIBODY SCREEN: CPT

## 2017-03-07 PROCEDURE — 86704 HEP B CORE ANTIBODY TOTAL: CPT

## 2017-03-07 PROCEDURE — C1750: CPT

## 2017-03-07 PROCEDURE — 81001 URINALYSIS AUTO W/SCOPE: CPT

## 2017-03-07 PROCEDURE — 85018 HEMOGLOBIN: CPT

## 2017-03-07 PROCEDURE — 99261: CPT

## 2017-03-07 PROCEDURE — 85027 COMPLETE CBC AUTOMATED: CPT

## 2017-03-07 PROCEDURE — 80069 RENAL FUNCTION PANEL: CPT

## 2017-03-07 PROCEDURE — 86901 BLOOD TYPING SEROLOGIC RH(D): CPT

## 2017-03-07 PROCEDURE — 93005 ELECTROCARDIOGRAM TRACING: CPT

## 2017-03-07 PROCEDURE — C1752: CPT

## 2017-03-07 PROCEDURE — 86900 BLOOD TYPING SEROLOGIC ABO: CPT

## 2017-03-07 PROCEDURE — 87340 HEPATITIS B SURFACE AG IA: CPT

## 2017-03-07 PROCEDURE — P9016: CPT

## 2017-03-07 PROCEDURE — C1760: CPT

## 2017-03-07 PROCEDURE — 84443 ASSAY THYROID STIM HORMONE: CPT

## 2017-03-07 PROCEDURE — 94640 AIRWAY INHALATION TREATMENT: CPT

## 2017-03-07 PROCEDURE — 80053 COMPREHEN METABOLIC PANEL: CPT

## 2017-03-07 PROCEDURE — 84480 ASSAY TRIIODOTHYRONINE (T3): CPT

## 2017-03-07 PROCEDURE — 85610 PROTHROMBIN TIME: CPT

## 2017-03-07 PROCEDURE — 80048 BASIC METABOLIC PNL TOTAL CA: CPT

## 2017-03-07 PROCEDURE — 82270 OCCULT BLOOD FECES: CPT

## 2017-03-07 PROCEDURE — 86920 COMPATIBILITY TEST SPIN: CPT

## 2017-03-07 PROCEDURE — 36558 INSERT TUNNELED CV CATH: CPT

## 2017-03-07 PROCEDURE — 36556 INSERT NON-TUNNEL CV CATH: CPT

## 2017-03-07 PROCEDURE — 76937 US GUIDE VASCULAR ACCESS: CPT

## 2017-03-07 RX ORDER — LOSARTAN POTASSIUM 100 MG/1
1 TABLET, FILM COATED ORAL
Qty: 60 | Refills: 0 | OUTPATIENT
Start: 2017-03-07 | End: 2017-04-06

## 2017-03-07 RX ORDER — ALBUTEROL 90 UG/1
2 AEROSOL, METERED ORAL
Qty: 1 | Refills: 0 | OUTPATIENT
Start: 2017-03-07 | End: 2017-04-06

## 2017-03-07 RX ORDER — CLOPIDOGREL BISULFATE 75 MG/1
75 TABLET, FILM COATED ORAL
Qty: 0 | Refills: 0 | COMMUNITY

## 2017-03-07 RX ORDER — CALCIUM ACETATE 667 MG
1 TABLET ORAL
Qty: 270 | Refills: 0 | OUTPATIENT
Start: 2017-03-07 | End: 2017-06-05

## 2017-03-07 RX ORDER — ERYTHROPOIETIN 10000 [IU]/ML
0 INJECTION, SOLUTION INTRAVENOUS; SUBCUTANEOUS
Qty: 0 | Refills: 0 | COMMUNITY
Start: 2017-03-07

## 2017-03-07 RX ORDER — AMLODIPINE BESYLATE 2.5 MG/1
1 TABLET ORAL
Qty: 0 | Refills: 0 | COMMUNITY

## 2017-03-07 RX ORDER — DOCUSATE SODIUM 100 MG
1 CAPSULE ORAL
Qty: 0 | Refills: 0 | COMMUNITY
Start: 2017-03-07

## 2017-03-07 RX ORDER — PANTOPRAZOLE SODIUM 20 MG/1
1 TABLET, DELAYED RELEASE ORAL
Qty: 30 | Refills: 0 | OUTPATIENT
Start: 2017-03-07 | End: 2017-04-06

## 2017-03-07 RX ADMIN — CARVEDILOL PHOSPHATE 6.25 MILLIGRAM(S): 80 CAPSULE, EXTENDED RELEASE ORAL at 05:56

## 2017-03-07 RX ADMIN — LOSARTAN POTASSIUM 50 MILLIGRAM(S): 100 TABLET, FILM COATED ORAL at 05:56

## 2017-03-07 RX ADMIN — Medication 1334 MILLIGRAM(S): at 08:03

## 2017-03-07 RX ADMIN — Medication 100 MILLIGRAM(S): at 05:57

## 2017-03-07 RX ADMIN — Medication 0.2 MILLIGRAM(S): at 14:05

## 2017-03-07 RX ADMIN — Medication 50 MILLIGRAM(S): at 14:03

## 2017-03-07 RX ADMIN — Medication 50 MILLIGRAM(S): at 05:57

## 2017-03-07 RX ADMIN — Medication 1334 MILLIGRAM(S): at 14:03

## 2017-03-07 RX ADMIN — PANTOPRAZOLE SODIUM 40 MILLIGRAM(S): 20 TABLET, DELAYED RELEASE ORAL at 05:56

## 2017-03-07 RX ADMIN — Medication 100 MILLIGRAM(S): at 14:04

## 2017-03-07 RX ADMIN — Medication 325 MILLIGRAM(S): at 14:03

## 2017-03-07 RX ADMIN — ERYTHROPOIETIN 10000 UNIT(S): 10000 INJECTION, SOLUTION INTRAVENOUS; SUBCUTANEOUS at 11:52

## 2017-03-07 RX ADMIN — Medication 100 MILLIGRAM(S): at 14:03

## 2017-03-07 RX ADMIN — Medication 325 MILLIGRAM(S): at 15:26

## 2017-03-07 RX ADMIN — Medication 0.2 MILLIGRAM(S): at 05:57

## 2017-03-07 RX ADMIN — FLUTICASONE PROPIONATE AND SALMETEROL 1 DOSE(S): 50; 250 POWDER ORAL; RESPIRATORY (INHALATION) at 05:58

## 2017-03-07 RX ADMIN — Medication 1 MILLIGRAM(S): at 14:05

## 2017-03-08 ENCOUNTER — EMERGENCY (EMERGENCY)
Facility: HOSPITAL | Age: 67
LOS: 1 days | Discharge: HOME | End: 2017-03-08
Attending: EMERGENCY MEDICINE | Admitting: EMERGENCY MEDICINE
Payer: MEDICARE

## 2017-03-08 VITALS
DIASTOLIC BLOOD PRESSURE: 78 MMHG | TEMPERATURE: 98 F | OXYGEN SATURATION: 98 % | HEART RATE: 91 BPM | WEIGHT: 139.99 LBS | SYSTOLIC BLOOD PRESSURE: 213 MMHG | HEIGHT: 65 IN | RESPIRATION RATE: 17 BRPM

## 2017-03-08 VITALS
RESPIRATION RATE: 16 BRPM | HEART RATE: 58 BPM | DIASTOLIC BLOOD PRESSURE: 67 MMHG | OXYGEN SATURATION: 99 % | TEMPERATURE: 98 F | SYSTOLIC BLOOD PRESSURE: 183 MMHG

## 2017-03-08 DIAGNOSIS — I10 ESSENTIAL (PRIMARY) HYPERTENSION: ICD-10-CM

## 2017-03-08 DIAGNOSIS — K21.9 GASTRO-ESOPHAGEAL REFLUX DISEASE WITHOUT ESOPHAGITIS: ICD-10-CM

## 2017-03-08 DIAGNOSIS — I25.10 ATHEROSCLEROTIC HEART DISEASE OF NATIVE CORONARY ARTERY WITHOUT ANGINA PECTORIS: ICD-10-CM

## 2017-03-08 DIAGNOSIS — D64.9 ANEMIA, UNSPECIFIED: ICD-10-CM

## 2017-03-08 DIAGNOSIS — Z79.82 LONG TERM (CURRENT) USE OF ASPIRIN: ICD-10-CM

## 2017-03-08 DIAGNOSIS — T82.838A HEMORRHAGE DUE TO VASCULAR PROSTHETIC DEVICES, IMPLANTS AND GRAFTS, INITIAL ENCOUNTER: ICD-10-CM
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
## 2017-03-08 DIAGNOSIS — F17.200 NICOTINE DEPENDENCE, UNSPECIFIED, UNCOMPLICATED: ICD-10-CM

## 2017-03-08 DIAGNOSIS — E78.5 HYPERLIPIDEMIA, UNSPECIFIED: ICD-10-CM

## 2017-03-08 DIAGNOSIS — N19 UNSPECIFIED KIDNEY FAILURE: ICD-10-CM

## 2017-03-08 DIAGNOSIS — Z98.89 OTHER SPECIFIED POSTPROCEDURAL STATES: Chronic | ICD-10-CM

## 2017-03-08 DIAGNOSIS — J44.9 CHRONIC OBSTRUCTIVE PULMONARY DISEASE, UNSPECIFIED: ICD-10-CM

## 2017-03-08 PROCEDURE — 99283 EMERGENCY DEPT VISIT LOW MDM: CPT

## 2017-03-08 PROCEDURE — 99284 EMERGENCY DEPT VISIT MOD MDM: CPT

## 2017-03-08 RX ORDER — NEBIVOLOL HYDROCHLORIDE 5 MG/1
5 TABLET ORAL
Qty: 0 | Refills: 0 | COMMUNITY

## 2017-03-08 RX ADMIN — Medication 0.2 MILLIGRAM(S): at 08:14

## 2017-03-08 NOTE — ED PROVIDER NOTE - OBJECTIVE STATEMENT
Pt is a 67 yo male who presents to the ED with a cc of bleeding at his dialysis site.  Pt reports that he was recently in the hospital and was diagnosed with renal failure secondary to uncontrolled hypertension.  He required dialysis for this.  Pt had a temporary catheter placed initially on his right neck and then this was transition to a permacath on his right chest.  Pt states that the temporary catheter was removed on Monday.  He noted some oozing from the site and this has continued. Pt was dialyzed on Monday and was suppose to follow up today for his first outpatient  session but when he arrived the center stated that his paperwork was not in order and that he could not receive the treatment today.  Wife reports that she mentioned the oozing and they were told to report to the ED.  Pt denies lightheadedness, fever, chills, N/V/D/C, CP, SOB, abd pain.

## 2017-03-08 NOTE — ED PROVIDER NOTE - PROGRESS NOTE DETAILS
Bleeding controlled at this time stable for discharge home with outpatient follow up and dialysis on Friday Dr. Ware paged at 8:20 am call returned at 8:37.  Dialysis issues discussed.  He will contact the center to follow up.   Call returned pt can follow up on Friday for dialysis no need for emergent dialysis as pt symptoms free and had his last session on Monday.  Dr. Ware spoke with wife on the phone.   Pt had not taken his BP medication prior to arrival.  Medication provided and BP controlled at this time Bleeding controlled at this time stable for discharge home with outpatient follow up and dialysis on Friday.  All questions answered

## 2017-03-08 NOTE — ED PROVIDER NOTE - CHIEF COMPLAINT
The patient is a 66y Male complaining of The patient is a 66y Male complaining of bleeding at his dialysis site

## 2017-03-08 NOTE — ED PROVIDER NOTE - PMH
Anemia    CAD (coronary artery disease)    Constipation    COPD (chronic obstructive pulmonary disease)    Dizziness    GERD (gastroesophageal reflux disease)    HTN (hypertension)    Hyperlipidemia    Nicotine addiction    Pancreatitis    Renal failure  with dialysis in the past for Acute RF  Suicidal overdose  Anti-freeze - 7 years ago

## 2017-03-08 NOTE — ED PROVIDER NOTE - CARE PLAN
Principal Discharge DX:	Dialysis complication, initial encounter  Instructions for follow-up, activity and diet:	Return to the ED for any new or worsening symptoms  Take your medication as prescribed  Follow up with your PMD within the week for a recheck   Dialysis on Friday as scheduled   Surgicel removal in 2 days   Dressing care as described  Secondary Diagnosis:	Bleeding

## 2017-03-08 NOTE — ED ADULT NURSE NOTE - OBJECTIVE STATEMENT
Presents to ER w c/o oozing from old HD site. Pt states line was removed 2 days ago and was replaced by PERMACATH yesterday. Upon assessment, site NOT bleeding, large blood clot noted.

## 2017-03-08 NOTE — ED PROVIDER NOTE - CHPI ED SYMPTOMS NEG
no numbness/no nausea/no dizziness/no vomiting/no fever/no decreased eating/drinking/no chills/no pain/no tingling/no weakness

## 2017-03-08 NOTE — ED ADULT NURSE REASSESSMENT NOTE - NS ED NURSE REASSESS COMMENT FT1
Pt evaluated by Dr Elkins.  Surgical site replaced by sterile DSD, mildly oozing adjacent to surgical site.  Permacath sterile dsg replaced as well, C,D,I. No bleeding.

## 2017-03-08 NOTE — ED PROVIDER NOTE - SKIN, MLM
Perma cath noted to right upper chest wall dressing removed no bleeding noted, redressed.  Right neck region at site of previous temporary catheter: dressing removed clotted blood noted, clot removed puncture site seen no active bleeding no swelling noted skin tear noted proximal with mild oozing at this time pressure dressing applied

## 2017-03-09 ENCOUNTER — EMERGENCY (EMERGENCY)
Facility: HOSPITAL | Age: 67
LOS: 1 days | Discharge: HOME | End: 2017-03-09
Attending: EMERGENCY MEDICINE | Admitting: EMERGENCY MEDICINE
Payer: MEDICARE

## 2017-03-09 VITALS
SYSTOLIC BLOOD PRESSURE: 211 MMHG | TEMPERATURE: 98 F | RESPIRATION RATE: 16 BRPM | HEART RATE: 60 BPM | OXYGEN SATURATION: 100 % | DIASTOLIC BLOOD PRESSURE: 75 MMHG

## 2017-03-09 VITALS
TEMPERATURE: 98 F | OXYGEN SATURATION: 100 % | DIASTOLIC BLOOD PRESSURE: 90 MMHG | SYSTOLIC BLOOD PRESSURE: 228 MMHG | RESPIRATION RATE: 16 BRPM | WEIGHT: 134.92 LBS | HEART RATE: 59 BPM | HEIGHT: 65 IN

## 2017-03-09 DIAGNOSIS — J44.9 CHRONIC OBSTRUCTIVE PULMONARY DISEASE, UNSPECIFIED: ICD-10-CM

## 2017-03-09 DIAGNOSIS — N18.6 END STAGE RENAL DISEASE: ICD-10-CM

## 2017-03-09 DIAGNOSIS — Z99.2 DEPENDENCE ON RENAL DIALYSIS: ICD-10-CM

## 2017-03-09 DIAGNOSIS — T82.41XA BREAKDOWN (MECHANICAL) OF VASCULAR DIALYSIS CATHETER, INITIAL ENCOUNTER: ICD-10-CM

## 2017-03-09 DIAGNOSIS — I25.10 ATHEROSCLEROTIC HEART DISEASE OF NATIVE CORONARY ARTERY WITHOUT ANGINA PECTORIS: ICD-10-CM

## 2017-03-09 DIAGNOSIS — K21.9 GASTRO-ESOPHAGEAL REFLUX DISEASE WITHOUT ESOPHAGITIS: ICD-10-CM

## 2017-03-09 DIAGNOSIS — F17.210 NICOTINE DEPENDENCE, CIGARETTES, UNCOMPLICATED: ICD-10-CM

## 2017-03-09 DIAGNOSIS — J45.909 UNSPECIFIED ASTHMA, UNCOMPLICATED: ICD-10-CM

## 2017-03-09 DIAGNOSIS — I12.0 HYPERTENSIVE CHRONIC KIDNEY DISEASE WITH STAGE 5 CHRONIC KIDNEY DISEASE OR END STAGE RENAL DISEASE: ICD-10-CM

## 2017-03-09 DIAGNOSIS — Z98.89 OTHER SPECIFIED POSTPROCEDURAL STATES: Chronic | ICD-10-CM

## 2017-03-09 DIAGNOSIS — E78.5 HYPERLIPIDEMIA, UNSPECIFIED: ICD-10-CM

## 2017-03-09 DIAGNOSIS — Z79.82 LONG TERM (CURRENT) USE OF ASPIRIN: ICD-10-CM

## 2017-03-09 DIAGNOSIS — K86.1 OTHER CHRONIC PANCREATITIS: ICD-10-CM

## 2017-03-09 DIAGNOSIS — D63.1 ANEMIA IN CHRONIC KIDNEY DISEASE: ICD-10-CM

## 2017-03-09 PROCEDURE — 99282 EMERGENCY DEPT VISIT SF MDM: CPT

## 2017-03-09 PROCEDURE — 99283 EMERGENCY DEPT VISIT LOW MDM: CPT

## 2017-03-09 NOTE — ED ADULT NURSE REASSESSMENT NOTE - NS ED NURSE REASSESS COMMENT FT1
Pancho CHEUNG aware of patient's elevated BP. patient reports that he missed a dose of BP medication today. Will continue to monitor.

## 2017-03-09 NOTE — ED PROVIDER NOTE - OBJECTIVE STATEMENT
65 yo male, present to ED for evaluation and management of oozing blood from dialysis catheter site in right upper chest wall x today. Similar episode yesterday which resolved after dressing replaced

## 2017-03-09 NOTE — ED ADULT NURSE NOTE - OBJECTIVE STATEMENT
patient presenting to ED with bleeding at suture site near permacath located on right chest wall over the past few days. pressure dresssing in place, removed and redressed. patient denies dizziness or any other symptom. will continue to monitor.

## 2017-03-09 NOTE — ED PROVIDER NOTE - CONSTITUTIONAL, MLM
normal... Chronically ill appearing male, well nourished, awake, alert, oriented to person, place, time/situation and in no apparent distress.

## 2017-04-03 ENCOUNTER — APPOINTMENT (OUTPATIENT)
Dept: CARDIOLOGY | Facility: CLINIC | Age: 67
End: 2017-04-03
Payer: MEDICARE

## 2017-04-03 ENCOUNTER — NON-APPOINTMENT (OUTPATIENT)
Age: 67
End: 2017-04-03

## 2017-04-03 VITALS
WEIGHT: 132 LBS | HEIGHT: 66 IN | HEART RATE: 59 BPM | BODY MASS INDEX: 21.21 KG/M2 | SYSTOLIC BLOOD PRESSURE: 188 MMHG | DIASTOLIC BLOOD PRESSURE: 54 MMHG | OXYGEN SATURATION: 95 %

## 2017-04-03 VITALS — SYSTOLIC BLOOD PRESSURE: 152 MMHG | DIASTOLIC BLOOD PRESSURE: 80 MMHG

## 2017-04-03 PROCEDURE — 99215 OFFICE O/P EST HI 40 MIN: CPT | Mod: 25

## 2017-04-03 PROCEDURE — 99214 OFFICE O/P EST MOD 30 MIN: CPT

## 2017-04-03 PROCEDURE — 93000 ELECTROCARDIOGRAM COMPLETE: CPT

## 2017-04-19 ENCOUNTER — APPOINTMENT (OUTPATIENT)
Dept: CARDIOLOGY | Facility: CLINIC | Age: 67
End: 2017-04-19
Payer: MEDICARE

## 2017-04-19 ENCOUNTER — NON-APPOINTMENT (OUTPATIENT)
Age: 67
End: 2017-04-19

## 2017-04-19 VITALS
OXYGEN SATURATION: 99 % | HEART RATE: 57 BPM | WEIGHT: 132 LBS | BODY MASS INDEX: 21.31 KG/M2 | DIASTOLIC BLOOD PRESSURE: 54 MMHG | SYSTOLIC BLOOD PRESSURE: 146 MMHG

## 2017-04-19 PROCEDURE — 99215 OFFICE O/P EST HI 40 MIN: CPT | Mod: 25

## 2017-04-19 PROCEDURE — 93000 ELECTROCARDIOGRAM COMPLETE: CPT

## 2017-05-04 ENCOUNTER — APPOINTMENT (OUTPATIENT)
Dept: CARDIOLOGY | Facility: CLINIC | Age: 67
End: 2017-05-04
Payer: MEDICARE

## 2017-05-04 PROCEDURE — 93306 TTE W/DOPPLER COMPLETE: CPT

## 2017-05-10 ENCOUNTER — APPOINTMENT (OUTPATIENT)
Dept: CARDIOLOGY | Facility: CLINIC | Age: 67
End: 2017-05-10
Payer: MEDICARE

## 2017-05-10 PROCEDURE — A9500: CPT

## 2017-05-10 PROCEDURE — 93015 CV STRESS TEST SUPVJ I&R: CPT

## 2017-05-10 PROCEDURE — 78452 HT MUSCLE IMAGE SPECT MULT: CPT

## 2017-05-11 ENCOUNTER — OUTPATIENT (OUTPATIENT)
Dept: OUTPATIENT SERVICES | Facility: HOSPITAL | Age: 67
LOS: 1 days | Discharge: HOME | End: 2017-05-11
Payer: MEDICARE

## 2017-05-11 ENCOUNTER — RESULT REVIEW (OUTPATIENT)
Age: 67
End: 2017-05-11

## 2017-05-11 ENCOUNTER — TRANSCRIPTION ENCOUNTER (OUTPATIENT)
Age: 67
End: 2017-05-11

## 2017-05-11 DIAGNOSIS — Z98.89 OTHER SPECIFIED POSTPROCEDURAL STATES: Chronic | ICD-10-CM

## 2017-05-11 DIAGNOSIS — R19.5 OTHER FECAL ABNORMALITIES: ICD-10-CM

## 2017-05-11 DIAGNOSIS — D50.0 IRON DEFICIENCY ANEMIA SECONDARY TO BLOOD LOSS (CHRONIC): ICD-10-CM

## 2017-05-11 PROCEDURE — 45380 COLONOSCOPY AND BIOPSY: CPT

## 2017-05-11 PROCEDURE — 88305 TISSUE EXAM BY PATHOLOGIST: CPT | Mod: 26

## 2017-05-11 PROCEDURE — 88305 TISSUE EXAM BY PATHOLOGIST: CPT

## 2017-05-11 PROCEDURE — 43239 EGD BIOPSY SINGLE/MULTIPLE: CPT

## 2017-05-11 PROCEDURE — 88313 SPECIAL STAINS GROUP 2: CPT

## 2017-05-11 PROCEDURE — 88312 SPECIAL STAINS GROUP 1: CPT | Mod: 26

## 2017-05-11 PROCEDURE — 88312 SPECIAL STAINS GROUP 1: CPT

## 2017-05-11 PROCEDURE — 88313 SPECIAL STAINS GROUP 2: CPT | Mod: 26

## 2017-05-12 ENCOUNTER — TRANSCRIPTION ENCOUNTER (OUTPATIENT)
Age: 67
End: 2017-05-12

## 2017-05-15 LAB — SURGICAL PATHOLOGY FINAL REPORT - CH: SIGNIFICANT CHANGE UP

## 2017-05-17 ENCOUNTER — APPOINTMENT (OUTPATIENT)
Dept: CARDIOLOGY | Facility: CLINIC | Age: 67
End: 2017-05-17
Payer: MEDICARE

## 2017-05-17 VITALS
WEIGHT: 130 LBS | HEART RATE: 65 BPM | OXYGEN SATURATION: 98 % | DIASTOLIC BLOOD PRESSURE: 60 MMHG | BODY MASS INDEX: 20.98 KG/M2 | SYSTOLIC BLOOD PRESSURE: 164 MMHG

## 2017-05-17 PROCEDURE — 93000 ELECTROCARDIOGRAM COMPLETE: CPT

## 2017-05-17 PROCEDURE — 99214 OFFICE O/P EST MOD 30 MIN: CPT | Mod: 25

## 2017-05-19 DIAGNOSIS — K21.0 GASTRO-ESOPHAGEAL REFLUX DISEASE WITH ESOPHAGITIS: ICD-10-CM

## 2017-05-19 DIAGNOSIS — K62.1 RECTAL POLYP: ICD-10-CM

## 2017-05-19 DIAGNOSIS — I10 ESSENTIAL (PRIMARY) HYPERTENSION: ICD-10-CM

## 2017-05-19 DIAGNOSIS — D64.9 ANEMIA, UNSPECIFIED: ICD-10-CM

## 2017-05-19 DIAGNOSIS — D12.5 BENIGN NEOPLASM OF SIGMOID COLON: ICD-10-CM

## 2017-05-19 DIAGNOSIS — K44.9 DIAPHRAGMATIC HERNIA WITHOUT OBSTRUCTION OR GANGRENE: ICD-10-CM

## 2017-05-19 DIAGNOSIS — K22.70 BARRETT'S ESOPHAGUS WITHOUT DYSPLASIA: ICD-10-CM

## 2017-05-19 DIAGNOSIS — D12.2 BENIGN NEOPLASM OF ASCENDING COLON: ICD-10-CM

## 2017-05-19 DIAGNOSIS — Z79.82 LONG TERM (CURRENT) USE OF ASPIRIN: ICD-10-CM

## 2017-05-19 DIAGNOSIS — K29.50 UNSPECIFIED CHRONIC GASTRITIS WITHOUT BLEEDING: ICD-10-CM

## 2017-05-19 DIAGNOSIS — K57.30 DIVERTICULOSIS OF LARGE INTESTINE WITHOUT PERFORATION OR ABSCESS WITHOUT BLEEDING: ICD-10-CM

## 2017-05-19 DIAGNOSIS — D12.0 BENIGN NEOPLASM OF CECUM: ICD-10-CM

## 2017-05-19 DIAGNOSIS — D12.4 BENIGN NEOPLASM OF DESCENDING COLON: ICD-10-CM

## 2017-05-19 DIAGNOSIS — K64.8 OTHER HEMORRHOIDS: ICD-10-CM

## 2017-07-24 ENCOUNTER — RX RENEWAL (OUTPATIENT)
Age: 67
End: 2017-07-24

## 2017-08-16 ENCOUNTER — APPOINTMENT (OUTPATIENT)
Dept: CARDIOLOGY | Facility: CLINIC | Age: 67
End: 2017-08-16
Payer: MEDICARE

## 2017-08-16 ENCOUNTER — NON-APPOINTMENT (OUTPATIENT)
Age: 67
End: 2017-08-16

## 2017-08-16 VITALS
HEIGHT: 66 IN | WEIGHT: 137 LBS | SYSTOLIC BLOOD PRESSURE: 176 MMHG | BODY MASS INDEX: 22.02 KG/M2 | DIASTOLIC BLOOD PRESSURE: 56 MMHG | HEART RATE: 61 BPM | OXYGEN SATURATION: 99 %

## 2017-08-16 VITALS — DIASTOLIC BLOOD PRESSURE: 70 MMHG | SYSTOLIC BLOOD PRESSURE: 160 MMHG

## 2017-08-16 PROCEDURE — 99214 OFFICE O/P EST MOD 30 MIN: CPT | Mod: 25

## 2017-08-16 PROCEDURE — 93000 ELECTROCARDIOGRAM COMPLETE: CPT

## 2017-09-27 ENCOUNTER — NON-APPOINTMENT (OUTPATIENT)
Age: 67
End: 2017-09-27

## 2017-09-27 ENCOUNTER — APPOINTMENT (OUTPATIENT)
Dept: CARDIOLOGY | Facility: CLINIC | Age: 67
End: 2017-09-27
Payer: MEDICARE

## 2017-09-27 VITALS — HEART RATE: 60 BPM | SYSTOLIC BLOOD PRESSURE: 163 MMHG | DIASTOLIC BLOOD PRESSURE: 68 MMHG | OXYGEN SATURATION: 99 %

## 2017-09-27 VITALS — HEIGHT: 66 IN | BODY MASS INDEX: 22.34 KG/M2 | WEIGHT: 139 LBS

## 2017-09-27 PROCEDURE — 93000 ELECTROCARDIOGRAM COMPLETE: CPT

## 2017-09-27 PROCEDURE — 99214 OFFICE O/P EST MOD 30 MIN: CPT | Mod: 25

## 2017-11-07 ENCOUNTER — NON-APPOINTMENT (OUTPATIENT)
Age: 67
End: 2017-11-07

## 2017-11-07 ENCOUNTER — APPOINTMENT (OUTPATIENT)
Dept: CARDIOLOGY | Facility: CLINIC | Age: 67
End: 2017-11-07
Payer: MEDICARE

## 2017-11-07 VITALS
OXYGEN SATURATION: 97 % | RESPIRATION RATE: 16 BRPM | WEIGHT: 146 LBS | TEMPERATURE: 97.7 F | HEIGHT: 66 IN | DIASTOLIC BLOOD PRESSURE: 63 MMHG | SYSTOLIC BLOOD PRESSURE: 150 MMHG | BODY MASS INDEX: 23.46 KG/M2

## 2017-11-07 PROCEDURE — 99215 OFFICE O/P EST HI 40 MIN: CPT | Mod: 25

## 2017-11-07 PROCEDURE — 93000 ELECTROCARDIOGRAM COMPLETE: CPT

## 2017-11-17 ENCOUNTER — RX RENEWAL (OUTPATIENT)
Age: 67
End: 2017-11-17

## 2018-01-15 ENCOUNTER — RX RENEWAL (OUTPATIENT)
Age: 68
End: 2018-01-15

## 2018-02-14 ENCOUNTER — RX RENEWAL (OUTPATIENT)
Age: 68
End: 2018-02-14

## 2018-02-14 ENCOUNTER — MEDICATION RENEWAL (OUTPATIENT)
Age: 68
End: 2018-02-14

## 2018-03-01 ENCOUNTER — EMERGENCY (EMERGENCY)
Facility: HOSPITAL | Age: 68
LOS: 1 days | Discharge: HOME | End: 2018-03-01
Attending: EMERGENCY MEDICINE | Admitting: EMERGENCY MEDICINE
Payer: MEDICARE

## 2018-03-01 VITALS — DIASTOLIC BLOOD PRESSURE: 58 MMHG | TEMPERATURE: 98 F | HEART RATE: 64 BPM | SYSTOLIC BLOOD PRESSURE: 140 MMHG

## 2018-03-01 VITALS
DIASTOLIC BLOOD PRESSURE: 70 MMHG | RESPIRATION RATE: 14 BRPM | OXYGEN SATURATION: 99 % | TEMPERATURE: 98 F | SYSTOLIC BLOOD PRESSURE: 137 MMHG | HEART RATE: 54 BPM | WEIGHT: 134.92 LBS | HEIGHT: 65 IN

## 2018-03-01 DIAGNOSIS — Z98.89 OTHER SPECIFIED POSTPROCEDURAL STATES: Chronic | ICD-10-CM

## 2018-03-01 LAB
ALBUMIN SERPL ELPH-MCNC: 3.2 G/DL — LOW (ref 3.3–5)
ALP SERPL-CCNC: 65 U/L — SIGNIFICANT CHANGE UP (ref 30–120)
ALT FLD-CCNC: 20 U/L DA — SIGNIFICANT CHANGE UP (ref 10–60)
ANION GAP SERPL CALC-SCNC: 9 MMOL/L — SIGNIFICANT CHANGE UP (ref 5–17)
AST SERPL-CCNC: 23 U/L — SIGNIFICANT CHANGE UP (ref 10–40)
BASOPHILS # BLD AUTO: 0.1 K/UL — SIGNIFICANT CHANGE UP (ref 0–0.2)
BASOPHILS NFR BLD AUTO: 1.1 % — SIGNIFICANT CHANGE UP (ref 0–2)
BILIRUB SERPL-MCNC: 0.6 MG/DL — SIGNIFICANT CHANGE UP (ref 0.2–1.2)
BUN SERPL-MCNC: 40 MG/DL — HIGH (ref 7–23)
CALCIUM SERPL-MCNC: 8.2 MG/DL — LOW (ref 8.4–10.5)
CHLORIDE SERPL-SCNC: 92 MMOL/L — LOW (ref 96–108)
CO2 SERPL-SCNC: 32 MMOL/L — HIGH (ref 22–31)
CREAT SERPL-MCNC: 5.7 MG/DL — HIGH (ref 0.5–1.3)
EOSINOPHIL # BLD AUTO: 0.2 K/UL — SIGNIFICANT CHANGE UP (ref 0–0.5)
EOSINOPHIL NFR BLD AUTO: 4.7 % — SIGNIFICANT CHANGE UP (ref 0–6)
FLUAV SPEC QL CULT: NEGATIVE — SIGNIFICANT CHANGE UP
FLUBV AG SPEC QL IA: NEGATIVE — SIGNIFICANT CHANGE UP
GLUCOSE SERPL-MCNC: 102 MG/DL — HIGH (ref 70–99)
HCT VFR BLD CALC: 36.8 % — LOW (ref 39–50)
HGB BLD-MCNC: 13.3 G/DL — SIGNIFICANT CHANGE UP (ref 13–17)
LACTATE SERPL-SCNC: 1.3 MMOL/L — SIGNIFICANT CHANGE UP (ref 0.7–2)
LYMPHOCYTES # BLD AUTO: 1.1 K/UL — SIGNIFICANT CHANGE UP (ref 1–3.3)
LYMPHOCYTES # BLD AUTO: 22.5 % — SIGNIFICANT CHANGE UP (ref 13–44)
MCHC RBC-ENTMCNC: 34.7 PG — HIGH (ref 27–34)
MCHC RBC-ENTMCNC: 36.1 GM/DL — HIGH (ref 32–36)
MCV RBC AUTO: 96.2 FL — SIGNIFICANT CHANGE UP (ref 80–100)
MONOCYTES # BLD AUTO: 0.6 K/UL — SIGNIFICANT CHANGE UP (ref 0–0.9)
MONOCYTES NFR BLD AUTO: 12.5 % — SIGNIFICANT CHANGE UP (ref 2–14)
NEUTROPHILS # BLD AUTO: 2.9 K/UL — SIGNIFICANT CHANGE UP (ref 1.8–7.4)
NEUTROPHILS NFR BLD AUTO: 59.2 % — SIGNIFICANT CHANGE UP (ref 43–77)
PLATELET # BLD AUTO: 138 K/UL — LOW (ref 150–400)
POTASSIUM SERPL-MCNC: 4 MMOL/L — SIGNIFICANT CHANGE UP (ref 3.5–5.3)
POTASSIUM SERPL-SCNC: 4 MMOL/L — SIGNIFICANT CHANGE UP (ref 3.5–5.3)
PROT SERPL-MCNC: 7.2 G/DL — SIGNIFICANT CHANGE UP (ref 6–8.3)
RBC # BLD: 3.83 M/UL — LOW (ref 4.2–5.8)
RBC # FLD: 12 % — SIGNIFICANT CHANGE UP (ref 10.3–14.5)
SODIUM SERPL-SCNC: 133 MMOL/L — LOW (ref 135–145)
WBC # BLD: 4.8 K/UL — SIGNIFICANT CHANGE UP (ref 3.8–10.5)
WBC # FLD AUTO: 4.8 K/UL — SIGNIFICANT CHANGE UP (ref 3.8–10.5)

## 2018-03-01 PROCEDURE — 80053 COMPREHEN METABOLIC PANEL: CPT

## 2018-03-01 PROCEDURE — 87486 CHLMYD PNEUM DNA AMP PROBE: CPT

## 2018-03-01 PROCEDURE — 87040 BLOOD CULTURE FOR BACTERIA: CPT

## 2018-03-01 PROCEDURE — 87581 M.PNEUMON DNA AMP PROBE: CPT

## 2018-03-01 PROCEDURE — 87798 DETECT AGENT NOS DNA AMP: CPT

## 2018-03-01 PROCEDURE — 99284 EMERGENCY DEPT VISIT MOD MDM: CPT

## 2018-03-01 PROCEDURE — 87633 RESP VIRUS 12-25 TARGETS: CPT

## 2018-03-01 PROCEDURE — 85027 COMPLETE CBC AUTOMATED: CPT

## 2018-03-01 PROCEDURE — 71046 X-RAY EXAM CHEST 2 VIEWS: CPT

## 2018-03-01 PROCEDURE — 83605 ASSAY OF LACTIC ACID: CPT

## 2018-03-01 PROCEDURE — 71046 X-RAY EXAM CHEST 2 VIEWS: CPT | Mod: 26

## 2018-03-01 PROCEDURE — 99284 EMERGENCY DEPT VISIT MOD MDM: CPT | Mod: 25

## 2018-03-01 PROCEDURE — 87400 INFLUENZA A/B EACH AG IA: CPT

## 2018-03-01 NOTE — ED PROVIDER NOTE - OBJECTIVE STATEMENT
pt c/o 1 week of cough and nasal congestion with body aches. pt reports wife with similar symptoms. no fevers, chills, ha, cp, sob, abd pain, edema, d/n/v. pt on dialysis m/w/f, had tx yest.  pmd - none currently  renal - charity  pulm - [facility]  cards - sheela

## 2018-03-01 NOTE — ED ADULT NURSE NOTE - OBJECTIVE STATEMENT
pt. presents with reported c/o cough/cold/generalized body aches x 1 week, accompanied by yellowish expectorate and watery stools pt. presents with reported c/o cough/cold/generalized body aches x 1 week, accompanied by yellowish expectorate and watery stools, HD pt: M, W, Fr, lue AVF, +thrill/bruit noted

## 2018-03-06 LAB
CULTURE RESULTS: SIGNIFICANT CHANGE UP
CULTURE RESULTS: SIGNIFICANT CHANGE UP
SPECIMEN SOURCE: SIGNIFICANT CHANGE UP
SPECIMEN SOURCE: SIGNIFICANT CHANGE UP

## 2018-03-28 ENCOUNTER — NON-APPOINTMENT (OUTPATIENT)
Age: 68
End: 2018-03-28

## 2018-03-28 ENCOUNTER — APPOINTMENT (OUTPATIENT)
Dept: CARDIOLOGY | Facility: CLINIC | Age: 68
End: 2018-03-28
Payer: MEDICARE

## 2018-03-28 VITALS — BODY MASS INDEX: 22.82 KG/M2 | HEIGHT: 66 IN | WEIGHT: 142 LBS

## 2018-03-28 PROCEDURE — 93000 ELECTROCARDIOGRAM COMPLETE: CPT

## 2018-03-28 PROCEDURE — 99215 OFFICE O/P EST HI 40 MIN: CPT | Mod: 25

## 2018-04-12 ENCOUNTER — APPOINTMENT (OUTPATIENT)
Dept: CARDIOLOGY | Facility: CLINIC | Age: 68
End: 2018-04-12
Payer: MEDICARE

## 2018-04-12 PROCEDURE — 93306 TTE W/DOPPLER COMPLETE: CPT

## 2018-04-19 ENCOUNTER — APPOINTMENT (OUTPATIENT)
Dept: CARDIOLOGY | Facility: CLINIC | Age: 68
End: 2018-04-19
Payer: MEDICARE

## 2018-04-19 PROCEDURE — A9500: CPT

## 2018-04-19 PROCEDURE — 93015 CV STRESS TEST SUPVJ I&R: CPT

## 2018-04-19 PROCEDURE — 78452 HT MUSCLE IMAGE SPECT MULT: CPT

## 2018-04-25 ENCOUNTER — APPOINTMENT (OUTPATIENT)
Dept: CARDIOLOGY | Facility: CLINIC | Age: 68
End: 2018-04-25
Payer: MEDICARE

## 2018-04-25 VITALS
WEIGHT: 142 LBS | HEART RATE: 60 BPM | HEIGHT: 66 IN | OXYGEN SATURATION: 97 % | BODY MASS INDEX: 22.82 KG/M2 | DIASTOLIC BLOOD PRESSURE: 55 MMHG | SYSTOLIC BLOOD PRESSURE: 132 MMHG

## 2018-04-25 DIAGNOSIS — R94.31 ABNORMAL ELECTROCARDIOGRAM [ECG] [EKG]: ICD-10-CM

## 2018-04-25 PROCEDURE — 99214 OFFICE O/P EST MOD 30 MIN: CPT

## 2018-05-14 ENCOUNTER — RX RENEWAL (OUTPATIENT)
Age: 68
End: 2018-05-14

## 2018-05-15 ENCOUNTER — MEDICATION RENEWAL (OUTPATIENT)
Age: 68
End: 2018-05-15

## 2018-06-05 ENCOUNTER — APPOINTMENT (OUTPATIENT)
Dept: INTERNAL MEDICINE | Facility: CLINIC | Age: 68
End: 2018-06-05
Payer: MEDICARE

## 2018-06-05 VITALS
HEIGHT: 66 IN | OXYGEN SATURATION: 93 % | WEIGHT: 148 LBS | DIASTOLIC BLOOD PRESSURE: 60 MMHG | SYSTOLIC BLOOD PRESSURE: 120 MMHG | HEART RATE: 47 BPM | BODY MASS INDEX: 23.78 KG/M2 | RESPIRATION RATE: 14 BRPM

## 2018-06-05 PROCEDURE — 99204 OFFICE O/P NEW MOD 45 MIN: CPT

## 2018-06-05 NOTE — HISTORY OF PRESENT ILLNESS
[de-identified] : PT on ESRD on dialysis- followed by Dr. Ware\par Pt with COPD- followed by pulmonary Dr. Dumont\par Pt with CAD- follow by cardiology\par Complains of fatigue after dialysis treatment.

## 2018-06-05 NOTE — HEALTH RISK ASSESSMENT
[Good] : ~his/her~  mood as  good [0] : 2) Feeling down, depressed, or hopeless: Not at all (0) [Patient reported colonoscopy was normal] : Patient reported colonoscopy was normal [ColonoscopyDate] : 01/17

## 2018-06-05 NOTE — ASSESSMENT
[FreeTextEntry1] : HTN- follow up with renal and cardiology\par ESRD- follow up with renal\par Pt to get all bloods today

## 2018-06-06 LAB
25(OH)D3 SERPL-MCNC: 36.2 NG/ML
ALBUMIN SERPL ELPH-MCNC: 4.4 G/DL
ALP BLD-CCNC: 61 U/L
ALT SERPL-CCNC: 17 U/L
ANION GAP SERPL CALC-SCNC: 17 MMOL/L
APPEARANCE: CLEAR
AST SERPL-CCNC: 21 U/L
BACTERIA: NEGATIVE
BASOPHILS # BLD AUTO: 0.07 K/UL
BASOPHILS NFR BLD AUTO: 0.9 %
BILIRUB SERPL-MCNC: 0.6 MG/DL
BILIRUBIN URINE: NEGATIVE
BLOOD URINE: NEGATIVE
BUN SERPL-MCNC: 53 MG/DL
CALCIUM SERPL-MCNC: 8.9 MG/DL
CHLORIDE SERPL-SCNC: 86 MMOL/L
CHOLEST SERPL-MCNC: 172 MG/DL
CHOLEST/HDLC SERPL: 2.5 RATIO
CO2 SERPL-SCNC: 28 MMOL/L
COLOR: YELLOW
CREAT SERPL-MCNC: 6.82 MG/DL
EOSINOPHIL # BLD AUTO: 0.48 K/UL
EOSINOPHIL NFR BLD AUTO: 6.1 %
FERRITIN SERPL-MCNC: 1176 NG/ML
FOLATE SERPL-MCNC: 11 NG/ML
GLUCOSE QUALITATIVE U: NEGATIVE MG/DL
GLUCOSE SERPL-MCNC: 107 MG/DL
HBA1C MFR BLD HPLC: 5.1 %
HCT VFR BLD CALC: 35 %
HDLC SERPL-MCNC: 69 MG/DL
HGB BLD-MCNC: 11.4 G/DL
IMM GRANULOCYTES NFR BLD AUTO: 0.5 %
KETONES URINE: NEGATIVE
LDLC SERPL CALC-MCNC: 93 MG/DL
LEUKOCYTE ESTERASE URINE: NEGATIVE
LYMPHOCYTES # BLD AUTO: 1.16 K/UL
LYMPHOCYTES NFR BLD AUTO: 14.7 %
MAN DIFF?: NORMAL
MCHC RBC-ENTMCNC: 31.8 PG
MCHC RBC-ENTMCNC: 32.6 GM/DL
MCV RBC AUTO: 97.5 FL
MICROSCOPIC-UA: NORMAL
MONOCYTES # BLD AUTO: 0.94 K/UL
MONOCYTES NFR BLD AUTO: 11.9 %
NEUTROPHILS # BLD AUTO: 5.18 K/UL
NEUTROPHILS NFR BLD AUTO: 65.9 %
NITRITE URINE: NEGATIVE
PH URINE: >8.5
PLATELET # BLD AUTO: 207 K/UL
POTASSIUM SERPL-SCNC: 5.5 MMOL/L
PROT SERPL-MCNC: 7.3 G/DL
PROTEIN URINE: 100 MG/DL
RBC # BLD: 3.59 M/UL
RBC # FLD: 14 %
RED BLOOD CELLS URINE: 0 /HPF
SODIUM SERPL-SCNC: 131 MMOL/L
SPECIFIC GRAVITY URINE: 1.01
SQUAMOUS EPITHELIAL CELLS: 0 /HPF
TRIGL SERPL-MCNC: 50 MG/DL
TSH SERPL-ACNC: 0.75 UIU/ML
UROBILINOGEN URINE: 1 MG/DL
VIT B12 SERPL-MCNC: 1165 PG/ML
WBC # FLD AUTO: 7.87 K/UL
WHITE BLOOD CELLS URINE: 0 /HPF

## 2018-08-08 ENCOUNTER — RX RENEWAL (OUTPATIENT)
Age: 68
End: 2018-08-08

## 2018-08-09 ENCOUNTER — RX RENEWAL (OUTPATIENT)
Age: 68
End: 2018-08-09

## 2018-08-10 ENCOUNTER — RX RENEWAL (OUTPATIENT)
Age: 68
End: 2018-08-10

## 2018-09-04 ENCOUNTER — APPOINTMENT (OUTPATIENT)
Dept: INTERNAL MEDICINE | Facility: CLINIC | Age: 68
End: 2018-09-04
Payer: MEDICARE

## 2018-09-04 VITALS
RESPIRATION RATE: 14 BRPM | HEIGHT: 66 IN | BODY MASS INDEX: 23.78 KG/M2 | WEIGHT: 148 LBS | OXYGEN SATURATION: 98 % | HEART RATE: 67 BPM | TEMPERATURE: 98.1 F | SYSTOLIC BLOOD PRESSURE: 120 MMHG | DIASTOLIC BLOOD PRESSURE: 60 MMHG

## 2018-09-04 PROCEDURE — 99214 OFFICE O/P EST MOD 30 MIN: CPT

## 2018-09-04 NOTE — ASSESSMENT
[FreeTextEntry1] : ESRD- follow up with renal.  Blood work done at dialysis. \par HTN- good control.\par History of CAD/MI- follow up with cardiology

## 2018-09-04 NOTE — HISTORY OF PRESENT ILLNESS
[de-identified] : Pt here today for follow up.\par Pt with ESRD on dialysis- awaiting kidney transplant.\par Pt getting dialysis TIW. Feels tired afterwards

## 2018-09-05 PROBLEM — D64.9 ANEMIA, UNSPECIFIED: Chronic | Status: ACTIVE | Noted: 2017-03-02

## 2018-09-05 PROBLEM — E78.5 HYPERLIPIDEMIA, UNSPECIFIED: Chronic | Status: ACTIVE | Noted: 2017-03-02

## 2018-09-05 PROBLEM — K59.00 CONSTIPATION, UNSPECIFIED: Chronic | Status: ACTIVE | Noted: 2017-03-02

## 2018-09-05 PROBLEM — R42 DIZZINESS AND GIDDINESS: Chronic | Status: ACTIVE | Noted: 2017-03-02

## 2018-11-01 ENCOUNTER — NON-APPOINTMENT (OUTPATIENT)
Age: 68
End: 2018-11-01

## 2018-11-01 ENCOUNTER — APPOINTMENT (OUTPATIENT)
Dept: CARDIOLOGY | Facility: CLINIC | Age: 68
End: 2018-11-01
Payer: MEDICARE

## 2018-11-01 VITALS
HEIGHT: 66 IN | BODY MASS INDEX: 22.98 KG/M2 | OXYGEN SATURATION: 98 % | HEART RATE: 50 BPM | WEIGHT: 143 LBS | DIASTOLIC BLOOD PRESSURE: 61 MMHG | SYSTOLIC BLOOD PRESSURE: 135 MMHG

## 2018-11-01 PROCEDURE — 93000 ELECTROCARDIOGRAM COMPLETE: CPT

## 2018-11-01 PROCEDURE — 99214 OFFICE O/P EST MOD 30 MIN: CPT | Mod: 25

## 2018-12-04 ENCOUNTER — APPOINTMENT (OUTPATIENT)
Dept: INTERNAL MEDICINE | Facility: CLINIC | Age: 68
End: 2018-12-04
Payer: MEDICARE

## 2018-12-04 VITALS
OXYGEN SATURATION: 97 % | RESPIRATION RATE: 14 BRPM | SYSTOLIC BLOOD PRESSURE: 130 MMHG | HEART RATE: 56 BPM | TEMPERATURE: 97.8 F | HEIGHT: 66 IN | WEIGHT: 143 LBS | BODY MASS INDEX: 22.98 KG/M2 | DIASTOLIC BLOOD PRESSURE: 60 MMHG

## 2018-12-04 PROCEDURE — 99213 OFFICE O/P EST LOW 20 MIN: CPT

## 2018-12-04 NOTE — HISTORY OF PRESENT ILLNESS
[de-identified] : Pt here today for blood pressure check.\par Pt on ESRD on dialysis TIW. Pt now off the hydralazine due to low  blood pressure.\par No complains today

## 2018-12-04 NOTE — ASSESSMENT
[FreeTextEntry1] : HCM- pt had PNA and Flu shot.\par ESRD- follow up with renal.\par HTN- good control\par Follow up with cards

## 2019-03-04 NOTE — ED PROVIDER NOTE - NS_EDPROVIDERDISPOUSERTYPE_ED_A_ED
RCA Cine(s)  injected and visualized utilizing power injector system.  Attending Attestation (For Attendings USE Only)...

## 2019-04-29 ENCOUNTER — RX RENEWAL (OUTPATIENT)
Age: 69
End: 2019-04-29

## 2019-05-01 ENCOUNTER — NON-APPOINTMENT (OUTPATIENT)
Age: 69
End: 2019-05-01

## 2019-05-01 ENCOUNTER — APPOINTMENT (OUTPATIENT)
Dept: CARDIOLOGY | Facility: CLINIC | Age: 69
End: 2019-05-01
Payer: MEDICARE

## 2019-05-01 VITALS
OXYGEN SATURATION: 100 % | WEIGHT: 140 LBS | BODY MASS INDEX: 22.5 KG/M2 | HEART RATE: 57 BPM | DIASTOLIC BLOOD PRESSURE: 73 MMHG | SYSTOLIC BLOOD PRESSURE: 195 MMHG | HEIGHT: 66 IN

## 2019-05-01 PROCEDURE — 99215 OFFICE O/P EST HI 40 MIN: CPT | Mod: 25

## 2019-05-01 PROCEDURE — 93000 ELECTROCARDIOGRAM COMPLETE: CPT

## 2019-05-01 RX ORDER — ALBUTEROL SULFATE 90 UG/1
108 (90 BASE) AEROSOL, METERED RESPIRATORY (INHALATION)
Qty: 8 | Refills: 0 | Status: COMPLETED | COMMUNITY
Start: 2018-11-27

## 2019-05-01 RX ORDER — HYDRALAZINE HYDROCHLORIDE 100 MG/1
100 TABLET ORAL EVERY 8 HOURS
Refills: 0 | Status: DISCONTINUED | COMMUNITY
End: 2019-03-01

## 2019-05-02 NOTE — CARDIOLOGY SUMMARY
[No Symptoms] : no Symptoms [No Ischemia] : no Ischemia [LVEF ___%] : LVEF [unfilled]% [___] : [unfilled] [None] : normal LV function [Enlarged] : enlarged LA size [Severe] : severe pulmonary hypertension  [Mild] : mild mitral regurgitation

## 2019-05-02 NOTE — PHYSICAL EXAM
[General Appearance - Well Developed] : well developed [Normal Appearance] : normal appearance [Well Groomed] : well groomed [General Appearance - Well Nourished] : well nourished [No Deformities] : no deformities [General Appearance - In No Acute Distress] : no acute distress [Normal Conjunctiva] : the conjunctiva exhibited no abnormalities [Eyelids - No Xanthelasma] : the eyelids demonstrated no xanthelasmas [Normal Oral Mucosa] : normal oral mucosa [No Oral Pallor] : no oral pallor [No Oral Cyanosis] : no oral cyanosis [Normal Jugular Venous A Waves Present] : normal jugular venous A waves present [Normal Jugular Venous V Waves Present] : normal jugular venous V waves present [No Jugular Venous Odom A Waves] : no jugular venous odom A waves [Respiration, Rhythm And Depth] : normal respiratory rhythm and effort [Bowel Sounds] : normal bowel sounds [Auscultation Breath Sounds / Voice Sounds] : lungs were clear to auscultation bilaterally [Exaggerated Use Of Accessory Muscles For Inspiration] : no accessory muscle use [Abdomen Tenderness] : non-tender [Abdomen Soft] : soft [Abnormal Walk] : normal gait [Abdomen Mass (___ Cm)] : no abdominal mass palpated [Gait - Sufficient For Exercise Testing] : the gait was sufficient for exercise testing [Petechial Hemorrhages (___cm)] : no petechial hemorrhages [Nail Clubbing] : no clubbing of the fingernails [Cyanosis, Localized] : no localized cyanosis [Skin Color & Pigmentation] : normal skin color and pigmentation [] : no rash [Skin Lesions] : no skin lesions [No Venous Stasis] : no venous stasis [No Skin Ulcers] : no skin ulcer [No Xanthoma] : no  xanthoma was observed [Affect] : the affect was normal [Oriented To Time, Place, And Person] : oriented to person, place, and time [Mood] : the mood was normal [No Anxiety] : not feeling anxious [5th Left ICS - MCL] : palpated at the 5th LICS in the midclavicular line [Hyperdynamic] : hyperdynamic [Normal Rate] : normal [Rhythm Regular] : regular [Normal S2] : normal S2 [Normal S1] : normal S1 [S4] : an S4 was heard [S3] : no S3 [I] : a grade 1 [Left Carotid Bruit] : no bruit heard over the left carotid [Right Carotid Bruit] : no bruit heard over the right carotid [Right Femoral Bruit] : no bruit heard over the right femoral artery [Left Femoral Bruit] : no bruit heard over the left femoral artery [2+] : left 2+ [No Pitting Edema] : no pitting edema present [No Abnormalities] : the abdominal aorta was not enlarged and no bruit was heard

## 2019-05-02 NOTE — REVIEW OF SYSTEMS
[Feeling Fatigued] : not feeling fatigued [see HPI] : see HPI [Shortness Of Breath] : no shortness of breath [Dyspnea on exertion] : not dyspnea during exertion [Chest  Pressure] : no chest pressure [Chest Pain] : no chest pain [Lower Ext Edema] : no extremity edema [Leg Claudication] : no intermittent leg claudication [Palpitations] : no palpitations [Abdominal Pain] : no abdominal pain [Nausea] : no nausea [Vomiting] : no vomiting [Heartburn] : no heartburn [Change in Appetite] : no change in appetite [Change In The Stool] : no change in stool [Dysphagia] : no dysphagia [Negative] : Endocrine

## 2019-05-02 NOTE — HISTORY OF PRESENT ILLNESS
[FreeTextEntry1] : Feels well but for the 1st time in a while BP is high.  Denies chest pain, shortness of breath, dyspnea on exertion, palpitations, orthopnea, paroxysmal nocturnal dyspnea, claudication, dizziness, or syncopal symptoms. \par

## 2019-05-02 NOTE — REASON FOR VISIT
[Follow-Up - Clinic] : a clinic follow-up of [Hyperlipidemia] : hyperlipidemia [Coronary Artery Disease] : coronary artery disease [Prior Myocardial Infarction] : a prior myocardial infarction [Hypertension] : hypertension

## 2019-05-02 NOTE — DISCUSSION/SUMMARY
[FreeTextEntry1] : Malignant hypertension:Case d/w Dr. Ware at visit (nephrologist). Restart Hydralazine 50 mg TID. (hold on am of dialysis days).   Low sodium diet discussed. \par Coronary artery disease: status post stenting: Stable on current medical therapy.  \par Aortic stenosis and  insufficiency: Echocardiogram shows mild AS and mild to moderate AI. Currently stable.\par Hyperlipidemia: Good results when checked at dialysis. Continue to monitor.\par Followup 2-3 months

## 2019-06-14 ENCOUNTER — APPOINTMENT (OUTPATIENT)
Dept: INTERNAL MEDICINE | Facility: CLINIC | Age: 69
End: 2019-06-14
Payer: MEDICARE

## 2019-06-14 VITALS
RESPIRATION RATE: 14 BRPM | HEIGHT: 66 IN | SYSTOLIC BLOOD PRESSURE: 156 MMHG | WEIGHT: 141 LBS | OXYGEN SATURATION: 97 % | HEART RATE: 58 BPM | BODY MASS INDEX: 22.66 KG/M2 | DIASTOLIC BLOOD PRESSURE: 74 MMHG | TEMPERATURE: 97.9 F

## 2019-06-14 VITALS — SYSTOLIC BLOOD PRESSURE: 130 MMHG | DIASTOLIC BLOOD PRESSURE: 78 MMHG

## 2019-06-14 PROCEDURE — 99213 OFFICE O/P EST LOW 20 MIN: CPT

## 2019-06-14 NOTE — ASSESSMENT
[FreeTextEntry1] : HTN-  elevated today. Hydralazine 50mg TID. \par Hyperlipidemia- good result when dialysis.\par CAD- stable\par Await kidney ( scheduled July 22 )\par Bursitis- to ortho for evaluation

## 2019-06-14 NOTE — PHYSICAL EXAM
[Well Nourished] : well nourished [No Acute Distress] : no acute distress [Normal Sclera/Conjunctiva] : normal sclera/conjunctiva [Well Developed] : well developed [Well-Appearing] : well-appearing [Normal Outer Ear/Nose] : the outer ears and nose were normal in appearance [PERRL] : pupils equal round and reactive to light [EOMI] : extraocular movements intact [No JVD] : no jugular venous distention [Normal Oropharynx] : the oropharynx was normal [No Lymphadenopathy] : no lymphadenopathy [Supple] : supple [Thyroid Normal, No Nodules] : the thyroid was normal and there were no nodules present [No Respiratory Distress] : no respiratory distress  [No Accessory Muscle Use] : no accessory muscle use [Normal Rate] : normal rate  [Clear to Auscultation] : lungs were clear to auscultation bilaterally [Normal S1, S2] : normal S1 and S2 [No Murmur] : no murmur heard [Regular Rhythm] : with a regular rhythm [No Abdominal Bruit] : a ~M bruit was not heard ~T in the abdomen [No Varicosities] : no varicosities [No Carotid Bruits] : no carotid bruits [Pedal Pulses Present] : the pedal pulses are present [No Edema] : there was no peripheral edema [Soft] : abdomen soft [No Extremity Clubbing/Cyanosis] : no extremity clubbing/cyanosis [No Palpable Aorta] : no palpable aorta [Non-distended] : non-distended [No HSM] : no HSM [Non Tender] : non-tender [No Masses] : no abdominal mass palpated [Normal Bowel Sounds] : normal bowel sounds [Normal Anterior Cervical Nodes] : no anterior cervical lymphadenopathy [Normal Posterior Cervical Nodes] : no posterior cervical lymphadenopathy [No Joint Swelling] : no joint swelling [No CVA Tenderness] : no CVA  tenderness [No Spinal Tenderness] : no spinal tenderness [Grossly Normal Strength/Tone] : grossly normal strength/tone [No Rash] : no rash [Normal Gait] : normal gait [Coordination Grossly Intact] : coordination grossly intact [Deep Tendon Reflexes (DTR)] : deep tendon reflexes were 2+ and symmetric [No Focal Deficits] : no focal deficits [Normal Insight/Judgement] : insight and judgment were intact [Normal Affect] : the affect was normal [de-identified] : right elbow with swelling

## 2019-06-14 NOTE — HISTORY OF PRESENT ILLNESS
[de-identified] : Pt here today for swelling of the right elbow.\par No redness no decrease ROM\par Pt with HTN- stable on Meds\par Pt scheduled for upcoming transplant.

## 2019-07-03 ENCOUNTER — APPOINTMENT (OUTPATIENT)
Dept: CARDIOLOGY | Facility: CLINIC | Age: 69
End: 2019-07-03

## 2019-07-03 VITALS
DIASTOLIC BLOOD PRESSURE: 63 MMHG | SYSTOLIC BLOOD PRESSURE: 144 MMHG | BODY MASS INDEX: 22.82 KG/M2 | HEIGHT: 66 IN | OXYGEN SATURATION: 99 % | HEART RATE: 59 BPM | WEIGHT: 142 LBS

## 2019-07-09 ENCOUNTER — NON-APPOINTMENT (OUTPATIENT)
Age: 69
End: 2019-07-09

## 2019-07-10 ENCOUNTER — APPOINTMENT (OUTPATIENT)
Dept: CARDIOLOGY | Facility: CLINIC | Age: 69
End: 2019-07-10
Payer: MEDICARE

## 2019-07-10 VITALS
HEIGHT: 66 IN | HEART RATE: 58 BPM | DIASTOLIC BLOOD PRESSURE: 55 MMHG | SYSTOLIC BLOOD PRESSURE: 162 MMHG | BODY MASS INDEX: 22.82 KG/M2 | OXYGEN SATURATION: 97 % | WEIGHT: 142 LBS

## 2019-07-10 PROCEDURE — 99215 OFFICE O/P EST HI 40 MIN: CPT

## 2019-07-10 NOTE — CARDIOLOGY SUMMARY
[No Ischemia] : no Ischemia [No Symptoms] : no Symptoms [___] : [unfilled] [LVEF ___%] : LVEF [unfilled]% [None] : normal LV function [Severe] : severe pulmonary hypertension  [Enlarged] : enlarged LA size [Mild] : mild mitral regurgitation

## 2019-07-10 NOTE — DISCUSSION/SUMMARY
[Procedure Intermediate Risk] : the procedure risk is intermediate [Patient Intermediate Risk] : the patient is an intermediate risk [Optimized for Surgery] : the patient is optimized for surgery [As per surgery] : as per surgery [FreeTextEntry3] : continue aspirin for surgery. [Continue] : Continue medications as currently directed [FreeTextEntry1] : [place] clarisa op risk score is 0.42% for a perio CV event.

## 2019-07-10 NOTE — REVIEW OF SYSTEMS
[see HPI] : see HPI [Feeling Fatigued] : not feeling fatigued [Shortness Of Breath] : no shortness of breath [Dyspnea on exertion] : not dyspnea during exertion [Chest  Pressure] : no chest pressure [Chest Pain] : no chest pain [Lower Ext Edema] : no extremity edema [Leg Claudication] : no intermittent leg claudication [Palpitations] : no palpitations [Abdominal Pain] : no abdominal pain [Nausea] : no nausea [Vomiting] : no vomiting [Heartburn] : no heartburn [Change In The Stool] : no change in stool [Change in Appetite] : no change in appetite [Dysphagia] : no dysphagia [Negative] : Heme/Lymph

## 2019-07-10 NOTE — HISTORY OF PRESENT ILLNESS
[Scheduled Procedure ___] : a [unfilled] [Preoperative Visit] : for a medical evaluation prior to surgery [Date of Surgery ___] : on [unfilled] [Surgeon Name ___] : surgeon: [unfilled] [Stable] : Stable [Cardiovascular Disease] : cardiovascular disease [Renal Disease] : renal disease [Frequent Aspirin Use] : frequent aspirin use [Prior Anesthesia] : Prior anesthesia [Prev Anesthesia Reaction] : no previous anesthesia reaction [Electrocardiogram] : ~T an ECG ~C was performed [Echocardiogram] : ~T an echocardiogram ~C was performed [Cardiac Catheterization  (Diagnostic)] : cardiac catheterization ~T ~C was performed [Fair] : Fair [Cardiovascular Stress Test] : a cardiac stress test ~T ~C was performed [FreeTextEntry1] : \par

## 2019-07-10 NOTE — PHYSICAL EXAM
[General Appearance - Well Developed] : well developed [Normal Appearance] : normal appearance [Well Groomed] : well groomed [No Deformities] : no deformities [General Appearance - Well Nourished] : well nourished [General Appearance - In No Acute Distress] : no acute distress [Normal Oral Mucosa] : normal oral mucosa [Normal Conjunctiva] : the conjunctiva exhibited no abnormalities [Eyelids - No Xanthelasma] : the eyelids demonstrated no xanthelasmas [No Oral Pallor] : no oral pallor [No Oral Cyanosis] : no oral cyanosis [Normal Jugular Venous A Waves Present] : normal jugular venous A waves present [Normal Jugular Venous V Waves Present] : normal jugular venous V waves present [No Jugular Venous Odom A Waves] : no jugular venous odom A waves [Respiration, Rhythm And Depth] : normal respiratory rhythm and effort [Auscultation Breath Sounds / Voice Sounds] : lungs were clear to auscultation bilaterally [Exaggerated Use Of Accessory Muscles For Inspiration] : no accessory muscle use [Bowel Sounds] : normal bowel sounds [Abdomen Soft] : soft [Abdomen Tenderness] : non-tender [Abdomen Mass (___ Cm)] : no abdominal mass palpated [Abnormal Walk] : normal gait [Gait - Sufficient For Exercise Testing] : the gait was sufficient for exercise testing [Nail Clubbing] : no clubbing of the fingernails [Cyanosis, Localized] : no localized cyanosis [Petechial Hemorrhages (___cm)] : no petechial hemorrhages [Skin Color & Pigmentation] : normal skin color and pigmentation [No Venous Stasis] : no venous stasis [] : no rash [No Skin Ulcers] : no skin ulcer [Skin Lesions] : no skin lesions [Affect] : the affect was normal [No Xanthoma] : no  xanthoma was observed [Oriented To Time, Place, And Person] : oriented to person, place, and time [No Anxiety] : not feeling anxious [Mood] : the mood was normal [Hyperdynamic] : hyperdynamic [5th Left ICS - MCL] : palpated at the 5th LICS in the midclavicular line [Normal Rate] : normal [Rhythm Regular] : regular [Normal S1] : normal S1 [Normal S2] : normal S2 [S4] : an S4 was heard [S3] : no S3 [I] : a grade 1 [Right Carotid Bruit] : no bruit heard over the right carotid [Left Carotid Bruit] : no bruit heard over the left carotid [Left Femoral Bruit] : no bruit heard over the left femoral artery [Right Femoral Bruit] : no bruit heard over the right femoral artery [2+] : right 2+ [No Abnormalities] : the abdominal aorta was not enlarged and no bruit was heard [No Pitting Edema] : no pitting edema present

## 2019-09-06 ENCOUNTER — APPOINTMENT (OUTPATIENT)
Dept: INTERNAL MEDICINE | Facility: CLINIC | Age: 69
End: 2019-09-06
Payer: MEDICARE

## 2019-09-06 VITALS
SYSTOLIC BLOOD PRESSURE: 140 MMHG | RESPIRATION RATE: 14 BRPM | OXYGEN SATURATION: 97 % | HEART RATE: 60 BPM | HEIGHT: 66 IN | DIASTOLIC BLOOD PRESSURE: 60 MMHG | BODY MASS INDEX: 22.66 KG/M2 | WEIGHT: 141 LBS | TEMPERATURE: 98.4 F

## 2019-09-06 PROCEDURE — 99214 OFFICE O/P EST MOD 30 MIN: CPT

## 2019-09-06 RX ORDER — HYDRALAZINE HYDROCHLORIDE 50 MG/1
50 TABLET ORAL
Qty: 270 | Refills: 3 | Status: DISCONTINUED | COMMUNITY
Start: 2019-05-01 | End: 2019-09-06

## 2019-09-06 NOTE — ASSESSMENT
[FreeTextEntry1] : s/p renal transplant- follow up with transplant team\par HTN- good control\par hyperlipidemia- check lipid next visit.

## 2019-09-06 NOTE — PHYSICAL EXAM
[No Acute Distress] : no acute distress [Well Nourished] : well nourished [Well Developed] : well developed [Well-Appearing] : well-appearing [Normal Sclera/Conjunctiva] : normal sclera/conjunctiva [EOMI] : extraocular movements intact [PERRL] : pupils equal round and reactive to light [Normal Oropharynx] : the oropharynx was normal [Normal Outer Ear/Nose] : the outer ears and nose were normal in appearance [No JVD] : no jugular venous distention [Supple] : supple [No Lymphadenopathy] : no lymphadenopathy [Thyroid Normal, No Nodules] : the thyroid was normal and there were no nodules present [No Respiratory Distress] : no respiratory distress  [No Accessory Muscle Use] : no accessory muscle use [Clear to Auscultation] : lungs were clear to auscultation bilaterally [Normal Rate] : normal rate  [Regular Rhythm] : with a regular rhythm [Normal S1, S2] : normal S1 and S2 [No Murmur] : no murmur heard [No Abdominal Bruit] : a ~M bruit was not heard ~T in the abdomen [No Varicosities] : no varicosities [No Carotid Bruits] : no carotid bruits [No Edema] : there was no peripheral edema [Pedal Pulses Present] : the pedal pulses are present [No Palpable Aorta] : no palpable aorta [No Extremity Clubbing/Cyanosis] : no extremity clubbing/cyanosis [Soft] : abdomen soft [Non Tender] : non-tender [Non-distended] : non-distended [No Masses] : no abdominal mass palpated [No HSM] : no HSM [Normal Bowel Sounds] : normal bowel sounds [Normal Posterior Cervical Nodes] : no posterior cervical lymphadenopathy [Normal Anterior Cervical Nodes] : no anterior cervical lymphadenopathy [No Spinal Tenderness] : no spinal tenderness [No CVA Tenderness] : no CVA  tenderness [No Rash] : no rash [No Joint Swelling] : no joint swelling [Grossly Normal Strength/Tone] : grossly normal strength/tone [Coordination Grossly Intact] : coordination grossly intact [No Focal Deficits] : no focal deficits [Normal Gait] : normal gait [Normal Affect] : the affect was normal [Deep Tendon Reflexes (DTR)] : deep tendon reflexes were 2+ and symmetric [Normal Insight/Judgement] : insight and judgment were intact [de-identified] : wound C/DI

## 2019-09-11 ENCOUNTER — NON-APPOINTMENT (OUTPATIENT)
Age: 69
End: 2019-09-11

## 2019-09-11 ENCOUNTER — APPOINTMENT (OUTPATIENT)
Dept: CARDIOLOGY | Facility: CLINIC | Age: 69
End: 2019-09-11
Payer: MEDICARE

## 2019-09-11 VITALS
DIASTOLIC BLOOD PRESSURE: 61 MMHG | HEART RATE: 56 BPM | SYSTOLIC BLOOD PRESSURE: 135 MMHG | HEIGHT: 66 IN | OXYGEN SATURATION: 100 % | BODY MASS INDEX: 22.34 KG/M2 | WEIGHT: 139 LBS

## 2019-09-11 PROCEDURE — 93000 ELECTROCARDIOGRAM COMPLETE: CPT

## 2019-09-11 PROCEDURE — 99214 OFFICE O/P EST MOD 30 MIN: CPT | Mod: 25

## 2019-09-11 RX ORDER — DOCUSATE SODIUM 100 MG/1
100 CAPSULE ORAL TWICE DAILY
Qty: 30 | Refills: 0 | Status: ACTIVE | COMMUNITY

## 2019-09-11 RX ORDER — CLONIDINE HYDROCHLORIDE 0.2 MG/1
0.2 TABLET ORAL TWICE DAILY
Qty: 90 | Refills: 0 | Status: ACTIVE | COMMUNITY

## 2019-09-11 RX ORDER — DOCUSATE SODIUM 100 MG/1
100 CAPSULE, LIQUID FILLED ORAL 3 TIMES DAILY
Qty: 90 | Refills: 1 | Status: DISCONTINUED | COMMUNITY
End: 2019-09-11

## 2019-09-11 RX ORDER — SIMVASTATIN 20 MG/1
20 TABLET, FILM COATED ORAL DAILY
Refills: 0 | Status: ACTIVE | COMMUNITY

## 2019-09-11 RX ORDER — MULTIVITAMIN
TABLET ORAL DAILY
Refills: 0 | Status: DISCONTINUED | COMMUNITY
End: 2019-09-11

## 2019-09-11 RX ORDER — LOSARTAN POTASSIUM 50 MG/1
50 TABLET, FILM COATED ORAL DAILY
Qty: 60 | Refills: 1 | Status: DISCONTINUED | COMMUNITY
End: 2019-09-11

## 2019-09-11 RX ORDER — CALCIUM ACETATE 667 MG/1
667 CAPSULE ORAL 3 TIMES DAILY
Refills: 0 | Status: DISCONTINUED | COMMUNITY
End: 2019-09-11

## 2019-09-11 RX ORDER — TAMSULOSIN HYDROCHLORIDE 0.4 MG/1
0.4 CAPSULE ORAL
Qty: 90 | Refills: 3 | Status: ACTIVE | COMMUNITY

## 2019-09-23 NOTE — PHYSICAL EXAM
[General Appearance - Well Developed] : well developed [Normal Appearance] : normal appearance [General Appearance - Well Nourished] : well nourished [Well Groomed] : well groomed [No Deformities] : no deformities [General Appearance - In No Acute Distress] : no acute distress [Normal Conjunctiva] : the conjunctiva exhibited no abnormalities [Eyelids - No Xanthelasma] : the eyelids demonstrated no xanthelasmas [Normal Oral Mucosa] : normal oral mucosa [No Oral Cyanosis] : no oral cyanosis [No Oral Pallor] : no oral pallor [Normal Jugular Venous A Waves Present] : normal jugular venous A waves present [Normal Jugular Venous V Waves Present] : normal jugular venous V waves present [No Jugular Venous Odom A Waves] : no jugular venous odom A waves [Respiration, Rhythm And Depth] : normal respiratory rhythm and effort [Auscultation Breath Sounds / Voice Sounds] : lungs were clear to auscultation bilaterally [Exaggerated Use Of Accessory Muscles For Inspiration] : no accessory muscle use [Bowel Sounds] : normal bowel sounds [Abdomen Soft] : soft [Abdomen Tenderness] : non-tender [Abdomen Mass (___ Cm)] : no abdominal mass palpated [Gait - Sufficient For Exercise Testing] : the gait was sufficient for exercise testing [Abnormal Walk] : normal gait [Nail Clubbing] : no clubbing of the fingernails [Cyanosis, Localized] : no localized cyanosis [Petechial Hemorrhages (___cm)] : no petechial hemorrhages [Skin Color & Pigmentation] : normal skin color and pigmentation [] : no rash [No Venous Stasis] : no venous stasis [Skin Lesions] : no skin lesions [No Skin Ulcers] : no skin ulcer [No Xanthoma] : no  xanthoma was observed [Oriented To Time, Place, And Person] : oriented to person, place, and time [Affect] : the affect was normal [Mood] : the mood was normal [5th Left ICS - MCL] : palpated at the 5th LICS in the midclavicular line [No Anxiety] : not feeling anxious [Hyperdynamic] : hyperdynamic [Normal Rate] : normal [Rhythm Regular] : regular [Normal S1] : normal S1 [Normal S2] : normal S2 [S4] : an S4 was heard [I] : a grade 1 [2+] : left 2+ [No Abnormalities] : the abdominal aorta was not enlarged and no bruit was heard [No Pitting Edema] : no pitting edema present [FreeTextEntry1] : no LE edema [S3] : no S3 [Right Carotid Bruit] : no bruit heard over the right carotid [Left Carotid Bruit] : no bruit heard over the left carotid [Right Femoral Bruit] : no bruit heard over the right femoral artery [Left Femoral Bruit] : no bruit heard over the left femoral artery

## 2019-09-23 NOTE — REVIEW OF SYSTEMS
[Feeling Fatigued] : feeling fatigued [see HPI] : see HPI [Negative] : Heme/Lymph [Shortness Of Breath] : no shortness of breath [Dyspnea on exertion] : not dyspnea during exertion [Chest Pain] : no chest pain [Chest  Pressure] : no chest pressure [Lower Ext Edema] : no extremity edema [Leg Claudication] : no intermittent leg claudication [Palpitations] : no palpitations [Abdominal Pain] : no abdominal pain [Nausea] : no nausea [Vomiting] : no vomiting [Heartburn] : no heartburn [Change in Appetite] : no change in appetite [Change In The Stool] : no change in stool [Dysphagia] : no dysphagia

## 2019-09-23 NOTE — HISTORY OF PRESENT ILLNESS
[FreeTextEntry1] : s/p left kidney  transplant at [facility] 2 months ago. Patient feels fine, not on HD. Feels tired , Blood pressure controlled on clonidine and labetalol. Been followed by transplant team at [facility].

## 2019-10-17 ENCOUNTER — APPOINTMENT (OUTPATIENT)
Dept: INTERNAL MEDICINE | Facility: CLINIC | Age: 69
End: 2019-10-17

## 2019-12-19 ENCOUNTER — APPOINTMENT (OUTPATIENT)
Dept: INTERNAL MEDICINE | Facility: CLINIC | Age: 69
End: 2019-12-19
Payer: MEDICARE

## 2019-12-19 VITALS
RESPIRATION RATE: 14 BRPM | OXYGEN SATURATION: 98 % | DIASTOLIC BLOOD PRESSURE: 60 MMHG | WEIGHT: 144 LBS | SYSTOLIC BLOOD PRESSURE: 132 MMHG | TEMPERATURE: 98.4 F | HEIGHT: 66 IN | BODY MASS INDEX: 23.14 KG/M2 | HEART RATE: 62 BPM

## 2019-12-19 PROCEDURE — 99214 OFFICE O/P EST MOD 30 MIN: CPT

## 2019-12-19 NOTE — PHYSICAL EXAM
[No Acute Distress] : no acute distress [Well Nourished] : well nourished [Well Developed] : well developed [Well-Appearing] : well-appearing [Normal Sclera/Conjunctiva] : normal sclera/conjunctiva [EOMI] : extraocular movements intact [PERRL] : pupils equal round and reactive to light [Normal Outer Ear/Nose] : the outer ears and nose were normal in appearance [Normal Oropharynx] : the oropharynx was normal [No JVD] : no jugular venous distention [No Lymphadenopathy] : no lymphadenopathy [Supple] : supple [No Respiratory Distress] : no respiratory distress  [Thyroid Normal, No Nodules] : the thyroid was normal and there were no nodules present [Clear to Auscultation] : lungs were clear to auscultation bilaterally [No Accessory Muscle Use] : no accessory muscle use [Normal Rate] : normal rate  [Regular Rhythm] : with a regular rhythm [Normal S1, S2] : normal S1 and S2 [No Murmur] : no murmur heard [No Carotid Bruits] : no carotid bruits [No Abdominal Bruit] : a ~M bruit was not heard ~T in the abdomen [No Varicosities] : no varicosities [No Edema] : there was no peripheral edema [Pedal Pulses Present] : the pedal pulses are present [No Extremity Clubbing/Cyanosis] : no extremity clubbing/cyanosis [No Palpable Aorta] : no palpable aorta [Soft] : abdomen soft [Non Tender] : non-tender [Non-distended] : non-distended [No Masses] : no abdominal mass palpated [No HSM] : no HSM [Normal Bowel Sounds] : normal bowel sounds [Normal Posterior Cervical Nodes] : no posterior cervical lymphadenopathy [Normal Anterior Cervical Nodes] : no anterior cervical lymphadenopathy [No CVA Tenderness] : no CVA  tenderness [No Spinal Tenderness] : no spinal tenderness [No Joint Swelling] : no joint swelling [Grossly Normal Strength/Tone] : grossly normal strength/tone [No Rash] : no rash [Coordination Grossly Intact] : coordination grossly intact [No Focal Deficits] : no focal deficits [Normal Gait] : normal gait [Deep Tendon Reflexes (DTR)] : deep tendon reflexes were 2+ and symmetric [Normal Affect] : the affect was normal [Normal Insight/Judgement] : insight and judgment were intact

## 2019-12-19 NOTE — ASSESSMENT
[FreeTextEntry1] : HTN- good control on clonidine and labetalol. Low sodium diet\par CAD- stable\par S/p renal transplant- to see  renal transplant team

## 2019-12-19 NOTE — HISTORY OF PRESENT ILLNESS
[de-identified] : pt here today for follow up.\par Pt s/p renal transplant. Pt is going monthly- pt with follow up tomorrow. Getting labs every 2 weeks. \par History of CAD, hypertension and prior myocardial infarction. \par Pt followed by cardiology. \par Feels fine,  not on HD.

## 2020-03-10 NOTE — DISCUSSION/SUMMARY
[FreeTextEntry1] : Kidney transplant: follow up with transplant team at [facility]\par Malignant hypertension:Controlled on clonidine and labetalol    Low sodium diet discussed. \par Coronary artery disease: status post stenting: Stable on current medical therapy.  \par Aortic stenosis and  insufficiency:. Currently stable, will monitor with repeat echo cardiogram  to assess for interval change \par Hyperlipidemia:continue zocor 20 mg daily. Discussed low cholesterol diet. \par OV in 6 months.  No

## 2020-03-11 ENCOUNTER — NON-APPOINTMENT (OUTPATIENT)
Age: 70
End: 2020-03-11

## 2020-03-11 ENCOUNTER — APPOINTMENT (OUTPATIENT)
Dept: CARDIOLOGY | Facility: CLINIC | Age: 70
End: 2020-03-11
Payer: MEDICARE

## 2020-03-11 VITALS
BODY MASS INDEX: 22.02 KG/M2 | SYSTOLIC BLOOD PRESSURE: 95 MMHG | HEIGHT: 66 IN | WEIGHT: 137 LBS | HEART RATE: 57 BPM | OXYGEN SATURATION: 100 % | DIASTOLIC BLOOD PRESSURE: 60 MMHG

## 2020-03-11 VITALS — DIASTOLIC BLOOD PRESSURE: 45 MMHG | SYSTOLIC BLOOD PRESSURE: 86 MMHG

## 2020-03-11 PROCEDURE — 93000 ELECTROCARDIOGRAM COMPLETE: CPT

## 2020-03-11 PROCEDURE — 99214 OFFICE O/P EST MOD 30 MIN: CPT | Mod: 25

## 2020-03-11 RX ORDER — PREDNISONE 5 MG/1
5 TABLET ORAL
Qty: 30 | Refills: 1 | Status: ACTIVE | COMMUNITY

## 2020-03-11 NOTE — CARDIOLOGY SUMMARY
[No Ischemia] : no Ischemia [No Symptoms] : no Symptoms [___] : [unfilled] [LVEF ___%] : LVEF [unfilled]% [None] : normal LV function [Severe] : severe pulmonary hypertension  [Enlarged] : enlarged LA size [Mild] : mild mitral regurgitation [___] : [unfilled]

## 2020-03-16 NOTE — PHYSICAL EXAM
[General Appearance - Well Developed] : well developed [Normal Appearance] : normal appearance [Well Groomed] : well groomed [General Appearance - Well Nourished] : well nourished [No Deformities] : no deformities [General Appearance - In No Acute Distress] : no acute distress [Normal Conjunctiva] : the conjunctiva exhibited no abnormalities [Eyelids - No Xanthelasma] : the eyelids demonstrated no xanthelasmas [Normal Oral Mucosa] : normal oral mucosa [No Oral Pallor] : no oral pallor [No Oral Cyanosis] : no oral cyanosis [Normal Jugular Venous A Waves Present] : normal jugular venous A waves present [Normal Jugular Venous V Waves Present] : normal jugular venous V waves present [No Jugular Venous Odom A Waves] : no jugular venous odom A waves [Respiration, Rhythm And Depth] : normal respiratory rhythm and effort [Exaggerated Use Of Accessory Muscles For Inspiration] : no accessory muscle use [Auscultation Breath Sounds / Voice Sounds] : lungs were clear to auscultation bilaterally [Bowel Sounds] : normal bowel sounds [Abdomen Soft] : soft [Abdomen Tenderness] : non-tender [Abdomen Mass (___ Cm)] : no abdominal mass palpated [Abnormal Walk] : normal gait [Gait - Sufficient For Exercise Testing] : the gait was sufficient for exercise testing [Nail Clubbing] : no clubbing of the fingernails [Cyanosis, Localized] : no localized cyanosis [Petechial Hemorrhages (___cm)] : no petechial hemorrhages [Skin Color & Pigmentation] : normal skin color and pigmentation [] : no rash [No Venous Stasis] : no venous stasis [Skin Lesions] : no skin lesions [No Skin Ulcers] : no skin ulcer [No Xanthoma] : no  xanthoma was observed [Oriented To Time, Place, And Person] : oriented to person, place, and time [Affect] : the affect was normal [Mood] : the mood was normal [No Anxiety] : not feeling anxious [5th Left ICS - MCL] : palpated at the 5th LICS in the midclavicular line [Hyperdynamic] : hyperdynamic [Normal Rate] : normal [Rhythm Regular] : regular [Normal S1] : normal S1 [Normal S2] : normal S2 [S4] : an S4 was heard [I] : a grade 1 [2+] : left 2+ [No Abnormalities] : the abdominal aorta was not enlarged and no bruit was heard [No Pitting Edema] : no pitting edema present [FreeTextEntry1] : no LE edema [S3] : no S3 [Right Carotid Bruit] : no bruit heard over the right carotid [Left Carotid Bruit] : no bruit heard over the left carotid [Right Femoral Bruit] : no bruit heard over the right femoral artery [Left Femoral Bruit] : no bruit heard over the left femoral artery

## 2020-03-16 NOTE — DISCUSSION/SUMMARY
[FreeTextEntry1] : Kidney transplant: follow up with transplant team at [facility]\par Malignant hypertension:Will decrease Labetalol to 100 mg daily. As per patient he does not take clonidine if blood pressure below 120.   Low sodium diet discussed. \par Coronary artery disease: status post stenting: Stable on current medical therapy.  \par Aortic stenosis and  insufficiency:. Currently stable, \par Hyperlipidemia:continue current treatment. Discussed low cholesterol diet. \par OV in 4-6 weeks

## 2020-03-16 NOTE — REVIEW OF SYSTEMS
[Feeling Fatigued] : feeling fatigued [see HPI] : see HPI [Negative] : Heme/Lymph [Shortness Of Breath] : no shortness of breath [Dyspnea on exertion] : not dyspnea during exertion [Chest  Pressure] : no chest pressure [Chest Pain] : no chest pain [Lower Ext Edema] : no extremity edema [Leg Claudication] : no intermittent leg claudication [Palpitations] : no palpitations [Abdominal Pain] : no abdominal pain [Nausea] : no nausea [Vomiting] : no vomiting [Heartburn] : no heartburn [Change in Appetite] : no change in appetite [Change In The Stool] : no change in stool [Dysphagia] : no dysphagia

## 2020-03-16 NOTE — HISTORY OF PRESENT ILLNESS
[FreeTextEntry1] : Presented to office for cardiac follow up. Reports that he has been tried recently and lightheaded when he changes positions. Other wise feels fine.  s/p left kidney  transplant at [facility] 2 months ago.  Been followed by transplant team at [facility].

## 2020-04-22 ENCOUNTER — APPOINTMENT (OUTPATIENT)
Dept: INTERNAL MEDICINE | Facility: CLINIC | Age: 70
End: 2020-04-22

## 2020-04-22 ENCOUNTER — APPOINTMENT (OUTPATIENT)
Dept: CARDIOLOGY | Facility: CLINIC | Age: 70
End: 2020-04-22

## 2020-07-30 NOTE — ED ADULT NURSE NOTE - PMH
Anemia    CAD (coronary artery disease)    Constipation    COPD (chronic obstructive pulmonary disease)    Dizziness    GERD (gastroesophageal reflux disease)    HTN (hypertension)    Hyperlipidemia    Nicotine addiction    Pancreatitis    Renal failure  with dialysis in the past for Acute RF  Suicidal overdose  Anti-freeze - 7 years ago 30-Jul-2020 08:03

## 2020-09-08 ENCOUNTER — APPOINTMENT (OUTPATIENT)
Dept: INTERNAL MEDICINE | Facility: CLINIC | Age: 70
End: 2020-09-08
Payer: MEDICARE

## 2020-09-08 VITALS
HEART RATE: 58 BPM | DIASTOLIC BLOOD PRESSURE: 62 MMHG | WEIGHT: 143 LBS | RESPIRATION RATE: 14 BRPM | SYSTOLIC BLOOD PRESSURE: 100 MMHG | TEMPERATURE: 98.6 F | BODY MASS INDEX: 22.98 KG/M2 | HEIGHT: 66 IN | OXYGEN SATURATION: 99 %

## 2020-09-08 PROCEDURE — 99204 OFFICE O/P NEW MOD 45 MIN: CPT

## 2020-09-08 PROCEDURE — 99213 OFFICE O/P EST LOW 20 MIN: CPT

## 2020-09-08 RX ORDER — MAGNESIUM OXIDE 241.3 MG/1000MG
400 TABLET ORAL TWICE DAILY
Refills: 0 | Status: ACTIVE | COMMUNITY

## 2020-09-08 NOTE — REVIEW OF SYSTEMS
[Heartburn] : heartburn [Negative] : Heme/Lymph [Fatigue] : fatigue [FreeTextEntry9] : lower extremity muscular pain; intermittent right shoulder/RUE (forearm) pain

## 2020-09-08 NOTE — PHYSICAL EXAM
[Well Nourished] : well nourished [No Acute Distress] : no acute distress [Well Developed] : well developed [Well-Appearing] : well-appearing [PERRL] : pupils equal round and reactive to light [Normal Sclera/Conjunctiva] : normal sclera/conjunctiva [EOMI] : extraocular movements intact [Normal Outer Ear/Nose] : the outer ears and nose were normal in appearance [No JVD] : no jugular venous distention [Normal Oropharynx] : the oropharynx was normal [Supple] : supple [No Lymphadenopathy] : no lymphadenopathy [Thyroid Normal, No Nodules] : the thyroid was normal and there were no nodules present [No Accessory Muscle Use] : no accessory muscle use [No Respiratory Distress] : no respiratory distress  [Normal Rate] : normal rate  [Clear to Auscultation] : lungs were clear to auscultation bilaterally [No Murmur] : no murmur heard [Regular Rhythm] : with a regular rhythm [Normal S1, S2] : normal S1 and S2 [No Carotid Bruits] : no carotid bruits [No Abdominal Bruit] : a ~M bruit was not heard ~T in the abdomen [No Varicosities] : no varicosities [No Edema] : there was no peripheral edema [Pedal Pulses Present] : the pedal pulses are present [No Extremity Clubbing/Cyanosis] : no extremity clubbing/cyanosis [No Palpable Aorta] : no palpable aorta [Soft] : abdomen soft [Non Tender] : non-tender [No Masses] : no abdominal mass palpated [Non-distended] : non-distended [No HSM] : no HSM [Normal Bowel Sounds] : normal bowel sounds [Normal Posterior Cervical Nodes] : no posterior cervical lymphadenopathy [No CVA Tenderness] : no CVA  tenderness [Normal Anterior Cervical Nodes] : no anterior cervical lymphadenopathy [No Joint Swelling] : no joint swelling [No Spinal Tenderness] : no spinal tenderness [No Rash] : no rash [Coordination Grossly Intact] : coordination grossly intact [Grossly Normal Strength/Tone] : grossly normal strength/tone [Normal Gait] : normal gait [No Focal Deficits] : no focal deficits [Normal Affect] : the affect was normal [Normal Insight/Judgement] : insight and judgment were intact

## 2020-09-08 NOTE — ADDENDUM
[FreeTextEntry1] : I, Aj Veloz, acted solely as scribe for Dr. Reese Clayton DO on this date 09/08/2020  1:00PM .\par \par All medical record entries made by the Scribe were at my, Dr. Reese Clayton DO direction and personally dictated by me on 09/08/2020  1:00PM. I have reviewed the chart and agree that the record accurately reflects my personal performance of the history, physical exam, assessment and plan. I have also personally directed, reviewed and agreed with the chart.\par

## 2020-09-08 NOTE — PLAN
[FreeTextEntry1] : Cardiology\par hypertension - continue Labetalol HCl 200mg BID p.o.q.d. and Clonidine HCl 0.2mg BID p.o.q.d. - continue low sodium diet\par hyperlipidemia - continue Simvastatin 20mg p.o.q.d. - continue low cholesterol/low fat diet \par CAD - s/p stenting (LAD) - continue Aspirin 81mg p.o.q.d; continue Simvastatin 20mg p.o.q.d. - continue to follow up with cardiologist, Dr. Cheung \par Nephrology\par CKD - s/p kidney transplant in 2019 - continue Tacrolimus BID p.o.q.d. as directed, CellCept 500mg QID p.o.q.d. as directed, and Prednisone 5mg p.o.q.d. - continue to follow up with transplant nephrologist at [facility]\par Endocrinology\par continue Vitamin D 2000 IU p.o.q.d. with meals \par Gastroenterology\par history of esophageal reflux - discontinue Protonix - recommended OTC Tums p.o. p.r.n. - continue antireflux measures\par Musculoskeletal\par right shoulder pain - Rx given for X-Ray of right shoulder \par Urology\par BPH - continue Tamsulosin HCl 0.4mg p.o.q.h.s. \par \par Patient to follow up for annual wellness visit in 1 month.

## 2020-09-08 NOTE — ASSESSMENT
[FreeTextEntry1] : New patient is a 69 year old male with a past medical history as above who presents to our office to re-establish care. \par

## 2020-09-08 NOTE — HISTORY OF PRESENT ILLNESS
[FreeTextEntry1] : new patient reestablishing care  [de-identified] : New patient is a 69 year old male with a past medical history as below who presents to our office to reestablish care. Patient denies lightheadedness or dizziness on Labetalol and Clonidine. He denies any diffuse arthralgias or myalgias on Simvastatin. Patient states he last took Protonix about 1 week ago. He notes heartburn about 1x per month. Patient regularly follows up with cardiologist, Dr. Cheung given history of CAD. He regularly follows up with his nephrologist at [facility] given history of CKD (s/p transplant in 2019). Patient sees ophthalmologist, Dr. Noriega. Patient notes lower extremity muscular pain after using his stationary bike. Patient notes intermittent right shoulder and RUE (forearm) pain which he states may be secondary to sleeping in an awkward position. He denies trauma to the area. Patient notes fatigue.

## 2020-09-30 ENCOUNTER — NON-APPOINTMENT (OUTPATIENT)
Age: 70
End: 2020-09-30

## 2020-09-30 ENCOUNTER — APPOINTMENT (OUTPATIENT)
Dept: CARDIOLOGY | Facility: CLINIC | Age: 70
End: 2020-09-30
Payer: MEDICARE

## 2020-09-30 VITALS
DIASTOLIC BLOOD PRESSURE: 60 MMHG | HEIGHT: 66 IN | WEIGHT: 146 LBS | BODY MASS INDEX: 23.46 KG/M2 | SYSTOLIC BLOOD PRESSURE: 160 MMHG | HEART RATE: 61 BPM | OXYGEN SATURATION: 100 %

## 2020-09-30 DIAGNOSIS — Z01.810 ENCOUNTER FOR PREPROCEDURAL CARDIOVASCULAR EXAMINATION: ICD-10-CM

## 2020-09-30 PROCEDURE — 93000 ELECTROCARDIOGRAM COMPLETE: CPT

## 2020-09-30 PROCEDURE — 99214 OFFICE O/P EST MOD 30 MIN: CPT

## 2020-09-30 NOTE — CARDIOLOGY SUMMARY
[No Ischemia] : no Ischemia [No Symptoms] : no Symptoms [___] : [unfilled] [LVEF ___%] : LVEF [unfilled]% [Enlarged] : enlarged LA size [None] : normal LV function [Severe] : severe pulmonary hypertension  [Mild] : mild mitral regurgitation

## 2020-09-30 NOTE — HISTORY OF PRESENT ILLNESS
[FreeTextEntry1] : Had dose change in tacrtolumis but no change in how he is feeling but has been walking 4 blocks in his neighborhood 3 x week. Also rides 6-10 minutes every day on stationary bike, Reports transplanted kidney well at last check. Denies chest pain, shortness of breath, dyspnea on exertion, palpitations, orthopnea, paroxysmal nocturnal dyspnea or dizziness. \par

## 2020-09-30 NOTE — REVIEW OF SYSTEMS
[see HPI] : see HPI [Negative] : Heme/Lymph [Feeling Fatigued] : not feeling fatigued [Shortness Of Breath] : no shortness of breath [Chest  Pressure] : no chest pressure [Dyspnea on exertion] : not dyspnea during exertion [Lower Ext Edema] : no extremity edema [Chest Pain] : no chest pain [Leg Claudication] : no intermittent leg claudication [Abdominal Pain] : no abdominal pain [Palpitations] : no palpitations [Nausea] : no nausea [Heartburn] : no heartburn [Vomiting] : no vomiting [Change In The Stool] : no change in stool [Change in Appetite] : no change in appetite [Dysphagia] : no dysphagia

## 2020-09-30 NOTE — DISCUSSION/SUMMARY
[FreeTextEntry1] : Coronary artery disease: Currently stable status post stenting. Denies palpitations/chest discomfort or shortness of breath. Continue current medical therapy.\par Aortic insufficiency/Stenosis: Murmur more prominent on exam. Echo to assess. \par  Hyperlipidemia: Reports good blood work recently. Will continue his current therapy and then recheck levels in the future. Low fat diet emphasized.\par Transplanted kidney: Continue to monitor with transplant center and nephrology.\par Followup 3 months.

## 2020-09-30 NOTE — REASON FOR VISIT
[Follow-Up - Clinic] : a clinic follow-up of [Coronary Artery Disease] : coronary artery disease [Hypertension] : hypertension [Medication Management] : Medication management [Hyperlipidemia] : hyperlipidemia [Prior Myocardial Infarction] : a prior myocardial infarction

## 2020-09-30 NOTE — PHYSICAL EXAM
[Normal Appearance] : normal appearance [General Appearance - Well Developed] : well developed [Well Groomed] : well groomed [No Deformities] : no deformities [General Appearance - Well Nourished] : well nourished [Normal Conjunctiva] : the conjunctiva exhibited no abnormalities [General Appearance - In No Acute Distress] : no acute distress [Eyelids - No Xanthelasma] : the eyelids demonstrated no xanthelasmas [Normal Oral Mucosa] : normal oral mucosa [No Oral Pallor] : no oral pallor [No Oral Cyanosis] : no oral cyanosis [Normal Jugular Venous A Waves Present] : normal jugular venous A waves present [Normal Jugular Venous V Waves Present] : normal jugular venous V waves present [No Jugular Venous Odom A Waves] : no jugular venous odom A waves [Respiration, Rhythm And Depth] : normal respiratory rhythm and effort [Exaggerated Use Of Accessory Muscles For Inspiration] : no accessory muscle use [Auscultation Breath Sounds / Voice Sounds] : lungs were clear to auscultation bilaterally [Bowel Sounds] : normal bowel sounds [Abdomen Tenderness] : non-tender [Abdomen Soft] : soft [Abdomen Mass (___ Cm)] : no abdominal mass palpated [Gait - Sufficient For Exercise Testing] : the gait was sufficient for exercise testing [Abnormal Walk] : normal gait [Nail Clubbing] : no clubbing of the fingernails [Cyanosis, Localized] : no localized cyanosis [Petechial Hemorrhages (___cm)] : no petechial hemorrhages [Skin Color & Pigmentation] : normal skin color and pigmentation [No Skin Ulcers] : no skin ulcer [] : no rash [No Venous Stasis] : no venous stasis [Skin Lesions] : no skin lesions [Oriented To Time, Place, And Person] : oriented to person, place, and time [No Xanthoma] : no  xanthoma was observed [No Anxiety] : not feeling anxious [Affect] : the affect was normal [Mood] : the mood was normal [Hyperdynamic] : hyperdynamic [5th Left ICS - MCL] : palpated at the 5th LICS in the midclavicular line [Normal Rate] : normal [Rhythm Regular] : regular [Normal S2] : normal S2 [Normal S1] : normal S1 [S4] : an S4 was heard [I] : a grade 1 [No Abnormalities] : the abdominal aorta was not enlarged and no bruit was heard [No Pitting Edema] : no pitting edema present [S3] : no S3 [II] : a grade 2 [Right Carotid Bruit] : no bruit heard over the right carotid [Left Carotid Bruit] : no bruit heard over the left carotid [2+] : left 2+ [Left Femoral Bruit] : no bruit heard over the left femoral artery

## 2020-10-05 NOTE — ED PROVIDER NOTE - SKIN, MLM
without difficulty Right subclavian ann marie catheter in place with scant bleeding from suture site

## 2020-10-07 ENCOUNTER — MED ADMIN CHARGE (OUTPATIENT)
Age: 70
End: 2020-10-07

## 2020-10-07 ENCOUNTER — APPOINTMENT (OUTPATIENT)
Dept: INTERNAL MEDICINE | Facility: CLINIC | Age: 70
End: 2020-10-07
Payer: MEDICARE

## 2020-10-07 VITALS
OXYGEN SATURATION: 98 % | RESPIRATION RATE: 16 BRPM | DIASTOLIC BLOOD PRESSURE: 62 MMHG | HEART RATE: 75 BPM | TEMPERATURE: 98.4 F | SYSTOLIC BLOOD PRESSURE: 144 MMHG | WEIGHT: 146.13 LBS | BODY MASS INDEX: 23.48 KG/M2 | HEIGHT: 66 IN

## 2020-10-07 PROCEDURE — G0442 ANNUAL ALCOHOL SCREEN 15 MIN: CPT | Mod: 59

## 2020-10-07 PROCEDURE — 90732 PPSV23 VACC 2 YRS+ SUBQ/IM: CPT

## 2020-10-07 PROCEDURE — G0009: CPT

## 2020-10-07 PROCEDURE — 99214 OFFICE O/P EST MOD 30 MIN: CPT | Mod: 25

## 2020-10-07 PROCEDURE — 90662 IIV NO PRSV INCREASED AG IM: CPT

## 2020-10-07 PROCEDURE — G0008: CPT

## 2020-10-07 NOTE — HEALTH RISK ASSESSMENT
[] : Yes [30 or more] : 30 or more [1 or 2 (0 pts)] : 1 or 2 (0 points) [Never (0 pts)] : Never (0 points) [No] : In the past 12 months have you used drugs other than those required for medical reasons? No [No falls in past year] : Patient reported no falls in the past year [0] : 2) Feeling down, depressed, or hopeless: Not at all (0) [Audit-CScore] : 0 [de-identified] : Stationary bike.  [[id number]] : 0

## 2020-10-07 NOTE — PLAN
[FreeTextEntry1] : Cardiology\par hypertension - continue Labetalol HCl 200 mg BID p.o.q.d. and Clonidine HCl 0.2 mg BID p.o.q.d. - continue low sodium diet.\par Request  a  of recent labs done by his nephrologist \par \par Pulmonology - Patient Continue to smoke tobacco -  Education provided advised to d/c to tobacco use.\par Advised to call [facility] to see if they did a CT scan of the chest in 2019 prior to having a renal transplant.  If Pt advised to get Ct of lung for cancer surveillance\par  \par hyperlipidemia - continue Simvastatin 20 mg p.o.q.d. - continue low cholesterol/low fat diet \par CAD - s/p stenting (LAD) - continue Aspirin 81 mg p.o.q.d; continue Simvastatin 20 mg p.o.q.d. - continue to follow up with cardiologist, Dr. Cheung \par Nephrology\par CKD - s/p kidney transplant in 2019 - continue Tacrolimus BID p.o.q.d. as directed, CellCept 50 0mg QID p.o.q.d. as directed, and Prednisone 5mg p.o.q.d. - continue to follow up with transplant nephrologist at [facility]\par \par Endocrinology\par continue Vitamin D 2000 IU p.o.q.d. with meals \par \par Urology\par BPH - continue Tamsulosin HCl 0.4mg p.o.q.h.s. \par \par Immunization - Flu shot given  and pneumonia vax 23  - consent formed signed.  CDC sheet given for pt educations \par Shingrix - discussed, patient to determine if vaccine is covered under medical benefits of current insurance plan and follow up for 1st dose if interested; otherwise, instructed to follow up at the pharmacy for vaccine \par HAd Tdap 2015\par

## 2020-10-07 NOTE — REVIEW OF SYSTEMS
[Negative] : Heme/Lymph [Fever] : no fever [Chills] : no chills [Fatigue] : no fatigue [Redness] : no redness [Vision Problems] : no vision problems [Earache] : no earache [Hearing Loss] : no hearing loss [Nasal Discharge] : no nasal discharge [Sore Throat] : no sore throat [Chest Pain] : no chest pain [Palpitations] : no palpitations [Lower Ext Edema] : no lower extremity edema [Shortness Of Breath] : no shortness of breath [Dyspnea on Exertion] : not dyspnea on exertion [Abdominal Pain] : no abdominal pain [Nausea] : no nausea [Vomiting] : no vomiting [Heartburn] : no heartburn [Dysuria] : no dysuria [Incontinence] : no incontinence [Hematuria] : no hematuria [Frequency] : no frequency [Joint Pain] : no joint pain [Muscle Weakness] : no muscle weakness [Mole Changes] : no mole changes [Hair Changes] : no hair changes [Headache] : no headache [Dizziness] : no dizziness [Unsteady Walk] : no ataxia [Memory Loss] : no memory loss

## 2020-10-07 NOTE — DATA REVIEWED
[No studies available for review at this time.] : No studies available for review at this time. [FreeTextEntry1] : Reviewed old chart from [facility]  no hx of a Pneumonia vax \par DId have Tdap 2015. \par

## 2020-10-07 NOTE — ASSESSMENT
[FreeTextEntry1] : A 70  year old male with a past medical history as above who presents to our office to have a check up and a flu shot

## 2020-10-07 NOTE — HISTORY OF PRESENT ILLNESS
[FreeTextEntry1] : Immunization update for the Flu shot and a general check up  [de-identified] : Mr. MARINA is a 70 year  male, who present to the office for general check up, and a flu shot.  Unaware if he ever got a pneumonia vax.  Does state he had the Tdap shot a few years ago.  \par Denies having any fever, chills or SOB.\par Going to see vascular next week to follow up on A/V fistula LUE.  S/l Renal transplant\par

## 2020-10-07 NOTE — PHYSICAL EXAM
[No Acute Distress] : no acute distress [Well Nourished] : well nourished [Well Developed] : well developed [Well-Appearing] : well-appearing [Normal Sclera/Conjunctiva] : normal sclera/conjunctiva [PERRL] : pupils equal round and reactive to light [EOMI] : extraocular movements intact [Normal Outer Ear/Nose] : the outer ears and nose were normal in appearance [Normal Oropharynx] : the oropharynx was normal [No JVD] : no jugular venous distention [No Lymphadenopathy] : no lymphadenopathy [Supple] : supple [Thyroid Normal, No Nodules] : the thyroid was normal and there were no nodules present [No Respiratory Distress] : no respiratory distress  [No Accessory Muscle Use] : no accessory muscle use [Clear to Auscultation] : lungs were clear to auscultation bilaterally [Normal Rate] : normal rate  [Regular Rhythm] : with a regular rhythm [Normal S1, S2] : normal S1 and S2 [No Carotid Bruits] : no carotid bruits [No Abdominal Bruit] : a ~M bruit was not heard ~T in the abdomen [No Varicosities] : no varicosities [Pedal Pulses Present] : the pedal pulses are present [No Edema] : there was no peripheral edema [No Palpable Aorta] : no palpable aorta [No Extremity Clubbing/Cyanosis] : no extremity clubbing/cyanosis [Soft] : abdomen soft [Non Tender] : non-tender [Non-distended] : non-distended [No Masses] : no abdominal mass palpated [No HSM] : no HSM [Normal Bowel Sounds] : normal bowel sounds [Normal Posterior Cervical Nodes] : no posterior cervical lymphadenopathy [Normal Anterior Cervical Nodes] : no anterior cervical lymphadenopathy [No CVA Tenderness] : no CVA  tenderness [No Spinal Tenderness] : no spinal tenderness [No Joint Swelling] : no joint swelling [Grossly Normal Strength/Tone] : grossly normal strength/tone [No Rash] : no rash [Coordination Grossly Intact] : coordination grossly intact [No Focal Deficits] : no focal deficits [Normal Gait] : normal gait [Normal Affect] : the affect was normal [Normal Insight/Judgement] : insight and judgment were intact [Thin Hair] : thin hair [de-identified] : with murmur  [de-identified] : fistula left upper ext

## 2020-10-21 ENCOUNTER — APPOINTMENT (OUTPATIENT)
Dept: INTERNAL MEDICINE | Facility: CLINIC | Age: 70
End: 2020-10-21
Payer: MEDICARE

## 2020-10-21 ENCOUNTER — NON-APPOINTMENT (OUTPATIENT)
Age: 70
End: 2020-10-21

## 2020-10-21 VITALS
HEIGHT: 66 IN | OXYGEN SATURATION: 99 % | RESPIRATION RATE: 14 BRPM | WEIGHT: 147.31 LBS | HEART RATE: 63 BPM | TEMPERATURE: 97.4 F | BODY MASS INDEX: 23.67 KG/M2 | SYSTOLIC BLOOD PRESSURE: 122 MMHG | DIASTOLIC BLOOD PRESSURE: 60 MMHG

## 2020-10-21 PROCEDURE — G0439: CPT

## 2020-10-21 PROCEDURE — 93000 ELECTROCARDIOGRAM COMPLETE: CPT

## 2020-10-22 NOTE — HISTORY OF PRESENT ILLNESS
[FreeTextEntry1] : annual wellness visit  [de-identified] : Patient is a 70 year old male with a past medical history as below who presents for an annual wellness visit. Patient states he is taking all medications as prescribed and denies any adverse reactions or side effects. He denies lightheadedness or dizziness on Labetalol and Clonidine. He denies diffuse arthralgias or myalgias on Simvastatin. Patient's last screening colonoscopy was in May 2017. He will be following up with gastroenterologist, Dr. Jauregui next month. Patient notes abdominal bloating. Patient denies any issues with vision. He uses hearing aids. Patient received the flu vaccine during his previous visit (10/7/20). He has received both Pneumonia vaccines (Prevnar 13 in March 2018 and Pneumovax 23 in October 2020). He received the Tdap (Adacel) Vaccine in November 2015. He is unsure if he has received the Shingles Vaccine (Shingrix). Patient notes recently having blood work done with his nephrologist.

## 2020-10-22 NOTE — ADDENDUM
[FreeTextEntry1] : I, Aj Veloz, acted solely as scribe for Dr. Reese Clayton DO on this date 10/21/2020 10:20AM .\par \par All medical record entries made by the Scribe were at my, Dr. Reese Clayton DO direction and personally dictated by me on 10/21/2020 10:20AM. I have reviewed the chart and agree that the record accurately reflects my personal performance of the history, physical exam, assessment and plan. I have also personally directed, reviewed and agreed with the chart.\par

## 2020-10-22 NOTE — PLAN
[FreeTextEntry1] : Cardiology\par hypertension - check EKG (results as above) - continue Labetalol HCl 200mg BID p.o.q.d. and Clonidine HCl 0.2mg BID p.o.q.d. - continue low sodium diet\par hyperlipidemia - continue Simvastatin 20mg p.o.q.d. - continue low cholesterol/low fat diet \par CAD - s/p stenting (LAD) - continue Aspirin 81mg p.o.q.d; continue Simvastatin 20mg p.o.q.d. - continue to follow up with cardiologist, Dr. Cheung \par Nephrology\par CKD - s/p kidney transplant in 2019 - continue Tacrolimus 1mg TID p.o.q.d. as directed, CellCept 500mg QID p.o.q.d. as directed, and Prednisone 5mg p.o.q.d. - continue to follow up with transplant nephrologist at [facility] \par Endocrinology\par continue Vitamin D 2000 IU p.o.q.d. with meals as directed \par Gastroenterology\par history of esophageal reflux - previously recommended OTC Tums p.o. p.r.n. - continue antireflux measures\par colonoscopy - follow up with gastroenterologist, Dr. Jauregui to discuss when a repeat screening should be scheduled\par Urology\par BPH - continue Tamsulosin HCl 0.4mg p.o.q.h.s. \par \par Patient to follow up in 3 months for fasting blood work.

## 2020-10-22 NOTE — HEALTH RISK ASSESSMENT
[30 or more] : 30 or more [No] : In the past 12 months have you used drugs other than those required for medical reasons? No [No falls in past year] : Patient reported no falls in the past year [0] : 2) Feeling down, depressed, or hopeless: Not at all (0) [Hepatitis C test offered] : Hepatitis C test offered [] : No [Audit-CScore] : 0 [de-identified] : Stationary bike.  [[id number]] : 0 [Reports changes in hearing] : Reports no changes in hearing [Reports changes in vision] : Reports no changes in vision [ColonoscopyDate] : 05/17 [ColonoscopyComments] : Polyps, diverticulosis; follow up with Dr. Jauregui.

## 2020-10-22 NOTE — ASSESSMENT
[FreeTextEntry1] : Patient is a 70 year old male with a past medical history as above who presents for an annual wellness visit.

## 2020-12-02 ENCOUNTER — APPOINTMENT (OUTPATIENT)
Dept: CARDIOLOGY | Facility: CLINIC | Age: 70
End: 2020-12-02

## 2020-12-04 ENCOUNTER — APPOINTMENT (OUTPATIENT)
Dept: CARDIOLOGY | Facility: CLINIC | Age: 70
End: 2020-12-04
Payer: MEDICARE

## 2020-12-04 PROCEDURE — 93306 TTE W/DOPPLER COMPLETE: CPT

## 2020-12-23 ENCOUNTER — APPOINTMENT (OUTPATIENT)
Dept: CARDIOLOGY | Facility: CLINIC | Age: 70
End: 2020-12-23
Payer: MEDICARE

## 2020-12-23 ENCOUNTER — NON-APPOINTMENT (OUTPATIENT)
Age: 70
End: 2020-12-23

## 2020-12-23 VITALS
HEART RATE: 59 BPM | OXYGEN SATURATION: 100 % | DIASTOLIC BLOOD PRESSURE: 70 MMHG | HEIGHT: 66 IN | SYSTOLIC BLOOD PRESSURE: 167 MMHG | WEIGHT: 135 LBS | BODY MASS INDEX: 21.69 KG/M2

## 2020-12-23 DIAGNOSIS — I35.1 NONRHEUMATIC AORTIC (VALVE) INSUFFICIENCY: ICD-10-CM

## 2020-12-23 PROCEDURE — 93000 ELECTROCARDIOGRAM COMPLETE: CPT

## 2020-12-23 PROCEDURE — 99214 OFFICE O/P EST MOD 30 MIN: CPT

## 2020-12-23 NOTE — DISCUSSION/SUMMARY
[FreeTextEntry1] : Coronary artery disease: Currently stable status post stenting. Denies chest discomfort or shortness of breath. Continue current medical therapy.\par Aortic insufficiency/Stenosis: Echo with good result. \par Hyperlipidemia: Reports good blood work recently. Low fat diet emphasized.\par Transplanted kidney: Continue to monitor with transplant center and nephrology.\par Followup 12 months.

## 2020-12-23 NOTE — PHYSICAL EXAM
[General Appearance - Well Developed] : well developed [Normal Appearance] : normal appearance [Well Groomed] : well groomed [General Appearance - Well Nourished] : well nourished [No Deformities] : no deformities [General Appearance - In No Acute Distress] : no acute distress [Normal Conjunctiva] : the conjunctiva exhibited no abnormalities [Eyelids - No Xanthelasma] : the eyelids demonstrated no xanthelasmas [Normal Oral Mucosa] : normal oral mucosa [No Oral Pallor] : no oral pallor [No Oral Cyanosis] : no oral cyanosis [Normal Jugular Venous A Waves Present] : normal jugular venous A waves present [Normal Jugular Venous V Waves Present] : normal jugular venous V waves present [No Jugular Venous Odom A Waves] : no jugular venous odom A waves [Respiration, Rhythm And Depth] : normal respiratory rhythm and effort [Exaggerated Use Of Accessory Muscles For Inspiration] : no accessory muscle use [Auscultation Breath Sounds / Voice Sounds] : lungs were clear to auscultation bilaterally [Bowel Sounds] : normal bowel sounds [Abdomen Soft] : soft [Abdomen Tenderness] : non-tender [Abdomen Mass (___ Cm)] : no abdominal mass palpated [Abnormal Walk] : normal gait [Gait - Sufficient For Exercise Testing] : the gait was sufficient for exercise testing [Nail Clubbing] : no clubbing of the fingernails [Cyanosis, Localized] : no localized cyanosis [Petechial Hemorrhages (___cm)] : no petechial hemorrhages [Skin Color & Pigmentation] : normal skin color and pigmentation [] : no rash [No Venous Stasis] : no venous stasis [Skin Lesions] : no skin lesions [No Skin Ulcers] : no skin ulcer [No Xanthoma] : no  xanthoma was observed [Oriented To Time, Place, And Person] : oriented to person, place, and time [Affect] : the affect was normal [Mood] : the mood was normal [No Anxiety] : not feeling anxious [5th Left ICS - MCL] : palpated at the 5th LICS in the midclavicular line [Hyperdynamic] : hyperdynamic [Normal Rate] : normal [Rhythm Regular] : regular [Normal S1] : normal S1 [Normal S2] : normal S2 [S3] : no S3 [S4] : an S4 was heard [II] : a grade 2 [I] : a grade 1 [Right Carotid Bruit] : no bruit heard over the right carotid [Left Carotid Bruit] : no bruit heard over the left carotid [2+] : left 2+ [Left Femoral Bruit] : no bruit heard over the left femoral artery [No Abnormalities] : the abdominal aorta was not enlarged and no bruit was heard [No Pitting Edema] : no pitting edema present

## 2020-12-23 NOTE — HISTORY OF PRESENT ILLNESS
[FreeTextEntry1] : Exercising and doing well. No issues with kidney.  Denies chest pain, shortness of breath, dyspnea on exertion, palpitations, orthopnea, paroxysmal nocturnal dyspnea or dizziness. \par

## 2020-12-23 NOTE — REVIEW OF SYSTEMS
[Feeling Fatigued] : not feeling fatigued [see HPI] : see HPI [Shortness Of Breath] : no shortness of breath [Dyspnea on exertion] : not dyspnea during exertion [Chest  Pressure] : no chest pressure [Chest Pain] : no chest pain [Lower Ext Edema] : no extremity edema [Leg Claudication] : no intermittent leg claudication [Palpitations] : no palpitations [Abdominal Pain] : no abdominal pain [Nausea] : no nausea [Vomiting] : no vomiting [Heartburn] : no heartburn [Change in Appetite] : no change in appetite [Change In The Stool] : no change in stool [Dysphagia] : no dysphagia [Negative] : Heme/Lymph

## 2021-01-11 ENCOUNTER — APPOINTMENT (OUTPATIENT)
Dept: INTERNAL MEDICINE | Facility: CLINIC | Age: 71
End: 2021-01-11
Payer: MEDICARE

## 2021-01-11 VITALS
SYSTOLIC BLOOD PRESSURE: 170 MMHG | OXYGEN SATURATION: 97 % | BODY MASS INDEX: 23.78 KG/M2 | WEIGHT: 148 LBS | TEMPERATURE: 97 F | RESPIRATION RATE: 16 BRPM | HEIGHT: 66 IN | DIASTOLIC BLOOD PRESSURE: 80 MMHG | HEART RATE: 68 BPM

## 2021-01-11 VITALS — DIASTOLIC BLOOD PRESSURE: 58 MMHG | SYSTOLIC BLOOD PRESSURE: 118 MMHG

## 2021-01-11 PROCEDURE — 36415 COLL VENOUS BLD VENIPUNCTURE: CPT

## 2021-01-11 PROCEDURE — 99214 OFFICE O/P EST MOD 30 MIN: CPT | Mod: 25

## 2021-01-11 NOTE — ASSESSMENT
[FreeTextEntry1] : Patient is a 70 year old male with a past medical history as above who presents for fasting blood work and general follow-up.\par

## 2021-01-11 NOTE — ADDENDUM
[FreeTextEntry1] : I, Aj Veloz, acted solely as scribe for Dr. Reese Clayton DO on this date 01/11/2021  9:00AM .\par \par All medical record entries made by the Scribe were at my, Dr. Reese Clayton DO direction and personally dictated by me on 01/11/2021  9:00AM. I have reviewed the chart and agree that the record accurately reflects my personal performance of the history, physical exam, assessment and plan. I have also personally directed, reviewed and agreed with the chart.\par

## 2021-01-11 NOTE — HISTORY OF PRESENT ILLNESS
[FreeTextEntry1] : fasting blood work and general follow-up\par  [de-identified] : Patient is a 70 year old male with a past medical history as below who presents for fasting blood work and general follow-up. Patient states he is taking all medications as prescribed and denies any adverse reactions or side effects. He denies lightheadedness or dizziness on Labetalol and Clonidine. He denies diffuse arthralgias or myalgias on Simvastatin. Patient notes regular bowel movements since starting Colace. Patient states he checks his BP every other day on his home monitor and denies any recently elevated readings. He notes recently testing negative for COVID-19 infection (Nasopharyngeal Swab).

## 2021-01-11 NOTE — HEALTH RISK ASSESSMENT
[30 or more] : 30 or more [No] : In the past 12 months have you used drugs other than those required for medical reasons? No [No falls in past year] : Patient reported no falls in the past year [0] : 2) Feeling down, depressed, or hopeless: Not at all (0) [Hepatitis C test offered] : Hepatitis C test offered [] : No [Audit-CScore] : 0 [de-identified] : Stationary bike.  [[id number]] : 0 [Reports changes in hearing] : Reports no changes in hearing [Reports changes in vision] : Reports no changes in vision [ColonoscopyDate] : 05/17 [ColonoscopyComments] : Polyps, diverticulosis; previously advised to follow up with Dr. Jauregui to determine when repeat screening should be scheduled.

## 2021-01-11 NOTE — PLAN
[FreeTextEntry1] : Cardiology\par hypertension - continue Labetalol HCl 200mg BID p.o.q.d. and Clonidine HCl 0.2mg BID p.o.q.d. - continue low sodium diet; will continue to monitor BP\par hyperlipidemia - continue Simvastatin 20mg p.o.q.d. - continue low cholesterol/low fat diet - check FLP and LFTs\par CAD - s/p stent (LAD) - continue Aspirin 81mg p.o.q.d; continue Simvastatin 20mg p.o.q.d. - continue to follow up with cardiologist, Dr. Cheung \par Nephrology\par CKD - s/p kidney transplant in 2019 - continue Tacrolimus 1mg TID p.o.q.d. as directed, CellCept 500mg QID p.o.q.d. as directed, and Prednisone 5mg p.o.q.d. - check CMP and UA - continue to follow up with transplant nephrologist at [facility] \par Endocrinology\par continue Vitamin D 2000 IU p.o.q.d. with meals as directed - check vitamin D \par Gastroenterology\par constipation - continue Colace 100mg BID p.o. as directed \par history of esophageal reflux - previously recommended OTC Tums p.o. p.r.n. - continue antireflux measures\par Urology\par BPH - continue Tamsulosin HCl 0.4mg p.o.q.h.s. - check PSA\par \par check male panel, hemoglobin A1C, and UA

## 2021-01-17 ENCOUNTER — NON-APPOINTMENT (OUTPATIENT)
Age: 71
End: 2021-01-17

## 2021-01-17 LAB
25(OH)D3 SERPL-MCNC: 33.6 NG/ML
ALBUMIN SERPL ELPH-MCNC: 4.4 G/DL
ALP BLD-CCNC: 70 U/L
ALT SERPL-CCNC: 15 U/L
ANION GAP SERPL CALC-SCNC: 11 MMOL/L
APPEARANCE: CLEAR
AST SERPL-CCNC: 15 U/L
BASOPHILS # BLD AUTO: 0.04 K/UL
BASOPHILS NFR BLD AUTO: 0.6 %
BILIRUB SERPL-MCNC: 0.4 MG/DL
BILIRUBIN URINE: NEGATIVE
BLOOD URINE: NEGATIVE
BUN SERPL-MCNC: 18 MG/DL
CALCIUM SERPL-MCNC: 9.3 MG/DL
CHLORIDE SERPL-SCNC: 102 MMOL/L
CHOLEST SERPL-MCNC: 156 MG/DL
CO2 SERPL-SCNC: 23 MMOL/L
COLOR: NORMAL
CREAT SERPL-MCNC: 0.87 MG/DL
EOSINOPHIL # BLD AUTO: 0.1 K/UL
EOSINOPHIL NFR BLD AUTO: 1.4 %
ESTIMATED AVERAGE GLUCOSE: 117 MG/DL
GLUCOSE QUALITATIVE U: NEGATIVE
GLUCOSE SERPL-MCNC: 143 MG/DL
HBA1C MFR BLD HPLC: 5.7 %
HCT VFR BLD CALC: 41.9 %
HDLC SERPL-MCNC: 50 MG/DL
HGB BLD-MCNC: 13.7 G/DL
IMM GRANULOCYTES NFR BLD AUTO: 0.3 %
KETONES URINE: NEGATIVE
LDLC SERPL CALC-MCNC: 69 MG/DL
LEUKOCYTE ESTERASE URINE: NEGATIVE
LYMPHOCYTES # BLD AUTO: 1.03 K/UL
LYMPHOCYTES NFR BLD AUTO: 14.5 %
MAN DIFF?: NORMAL
MCHC RBC-ENTMCNC: 32.5 PG
MCHC RBC-ENTMCNC: 32.7 GM/DL
MCV RBC AUTO: 99.5 FL
MONOCYTES # BLD AUTO: 0.56 K/UL
MONOCYTES NFR BLD AUTO: 7.9 %
NEUTROPHILS # BLD AUTO: 5.35 K/UL
NEUTROPHILS NFR BLD AUTO: 75.3 %
NITRITE URINE: NEGATIVE
NONHDLC SERPL-MCNC: 106 MG/DL
PH URINE: 6
PLATELET # BLD AUTO: 167 K/UL
POTASSIUM SERPL-SCNC: 5.2 MMOL/L
PROT SERPL-MCNC: 6.3 G/DL
PROTEIN URINE: NEGATIVE
PSA SERPL-MCNC: 0.52 NG/ML
RBC # BLD: 4.21 M/UL
RBC # FLD: 13.4 %
SODIUM SERPL-SCNC: 136 MMOL/L
SPECIFIC GRAVITY URINE: 1.02
TRIGL SERPL-MCNC: 185 MG/DL
TSH SERPL-ACNC: 0.72 UIU/ML
UROBILINOGEN URINE: NORMAL
WBC # FLD AUTO: 7.1 K/UL

## 2021-04-26 ENCOUNTER — APPOINTMENT (OUTPATIENT)
Dept: INTERNAL MEDICINE | Facility: CLINIC | Age: 71
End: 2021-04-26
Payer: MEDICARE

## 2021-04-26 VITALS — HEART RATE: 64 BPM

## 2021-04-26 VITALS
OXYGEN SATURATION: 97 % | SYSTOLIC BLOOD PRESSURE: 156 MMHG | HEIGHT: 66 IN | RESPIRATION RATE: 16 BRPM | HEART RATE: 68 BPM | DIASTOLIC BLOOD PRESSURE: 68 MMHG | BODY MASS INDEX: 22.66 KG/M2 | WEIGHT: 141 LBS | TEMPERATURE: 97 F

## 2021-04-26 VITALS — DIASTOLIC BLOOD PRESSURE: 66 MMHG | SYSTOLIC BLOOD PRESSURE: 146 MMHG

## 2021-04-26 DIAGNOSIS — N18.9 CHRONIC KIDNEY DISEASE, UNSPECIFIED: ICD-10-CM

## 2021-04-26 PROCEDURE — 99214 OFFICE O/P EST MOD 30 MIN: CPT

## 2021-04-26 RX ORDER — LABETALOL HYDROCHLORIDE 100 MG/1
100 TABLET, FILM COATED ORAL
Qty: 180 | Refills: 0 | Status: DISCONTINUED | COMMUNITY
Start: 2019-09-11 | End: 2021-04-26

## 2021-04-26 NOTE — ASSESSMENT
[FreeTextEntry1] : Patient is a 70 year old male with a past medical history as above who presents for Rx for fasting blood work and general follow-up.

## 2021-04-26 NOTE — ADDENDUM
[FreeTextEntry1] : I, Aj Veloz, acted solely as scribe for Dr. Reese Clayton DO on this date 04/26/2021 10:40AM .\par \par All medical record entries made by the Scribe were at my, Dr. Reese Clayton DO direction and personally dictated by me on 04/26/2021 10:40AM. I have reviewed the chart and agree that the record accurately reflects my personal performance of the history, physical exam, assessment and plan. I have also personally directed, reviewed and agreed with the chart.\par

## 2021-04-26 NOTE — PLAN
[FreeTextEntry1] : Cardiology\par hypertension - continue Labetalol HCl 300mg BID p.o.q.d. and Clonidine HCl 0.2mg BID p.o.q.d. - continue low sodium diet; will recheck BP in 3 months\par hyperlipidemia - continue Simvastatin 20mg p.o.q.d. - continue low cholesterol/low fat diet - check FLP and LFTs\par CAD - s/p stent (LAD) - continue Aspirin 81mg p.o.q.d. as directed; continue Simvastatin 20mg p.o.q.d. \par Referred to cardiologist, Dr. Norton \par Nephrology\par CKD - s/p kidney transplant in 2019 - continue Tacrolimus 1mg TID p.o.q.d. as directed, CellCept 500mg BID p.o.q.d. as directed, and Prednisone 5mg p.o.q.d. - check CMP - continue to follow up with transplant nephrologist at [facility] \par Endocrinology\par continue Vitamin D 2000 IU p.o.q.d. with meals as directed - check Vitamin D \par Gastroenterology\par constipation - continue Colace 100mg BID p.o. as directed \par history of esophageal reflux - previously recommended OTC Tums p.o. p.r.n. - continue antireflux measures\par Urology\par BPH - continue Tamsulosin HCl 0.4mg p.o.q.h.s. - check PSA\par \par Rx given for blood work (male panel, hemoglobin A1C); patient has blood drawn at home every month as per transplant nephrologist.

## 2021-04-26 NOTE — HISTORY OF PRESENT ILLNESS
[FreeTextEntry1] : follow up HTN,  general follow-up [de-identified] : Patient is a 70 year old male with a past medical history as below who presents for follow up of hypertension and general follow-up. Patient states he is taking all medications as prescribed and denies any adverse reactions or side effects. Patient states Labetalol dosage was increased from 200mg BID to 300mg BID 1 month ago by his nephrologist. Patient notes some dizziness when changing from a seated to standing position. He denies diffuse arthralgias or myalgias on Simvastatin. Patient inquires about a referral to a new cardiologist as his cardiologist has left the practice.. Patient regularly follows up with the transplant nephrologist at [facility] given history of kidney transplantation. He reports his most recent blood work-up revealed Creatinine of 0.82.  He gets home blood draws monthly from his nephrologist.

## 2021-04-26 NOTE — HEALTH RISK ASSESSMENT
[30 or more] : 30 or more [No] : In the past 12 months have you used drugs other than those required for medical reasons? No [No falls in past year] : Patient reported no falls in the past year [0] : 2) Feeling down, depressed, or hopeless: Not at all (0) [] : No [Audit-CScore] : 0 [de-identified] : Stationary bike.  [[id number]] : 0 [Reports changes in hearing] : Reports no changes in hearing [Reports changes in vision] : Reports no changes in vision [ColonoscopyDate] : 05/17 [ColonoscopyComments] : Polyps, diverticulosis; previously advised to follow up with Dr. Jauregui to determine when repeat screening should be scheduled.

## 2021-04-30 NOTE — H&P ADULT. - NS PRO PASSIVE SMOKE EXP
No Additional Notes: Patient consent was obtained to proceed with the visit and recommended plan of care after discussion of all risks and benefits, including the risks of COVID-19 exposure. Detail Level: Simple

## 2021-07-12 ENCOUNTER — APPOINTMENT (OUTPATIENT)
Dept: INTERNAL MEDICINE | Facility: CLINIC | Age: 71
End: 2021-07-12
Payer: MEDICARE

## 2021-08-02 ENCOUNTER — LABORATORY RESULT (OUTPATIENT)
Age: 71
End: 2021-08-02

## 2021-08-02 ENCOUNTER — APPOINTMENT (OUTPATIENT)
Dept: INTERNAL MEDICINE | Facility: CLINIC | Age: 71
End: 2021-08-02
Payer: MEDICARE

## 2021-08-02 VITALS
DIASTOLIC BLOOD PRESSURE: 58 MMHG | HEART RATE: 66 BPM | TEMPERATURE: 97.9 F | RESPIRATION RATE: 16 BRPM | SYSTOLIC BLOOD PRESSURE: 118 MMHG | HEIGHT: 66 IN | WEIGHT: 139.1 LBS | OXYGEN SATURATION: 98 % | BODY MASS INDEX: 22.35 KG/M2

## 2021-08-02 DIAGNOSIS — Z01.84 ENCOUNTER FOR ANTIBODY RESPONSE EXAMINATION: ICD-10-CM

## 2021-08-02 DIAGNOSIS — M25.511 PAIN IN RIGHT SHOULDER: ICD-10-CM

## 2021-08-02 LAB
BILIRUB UR QL STRIP: NEGATIVE
CLARITY UR: CLEAR
COLLECTION METHOD: NORMAL
GLUCOSE UR-MCNC: NEGATIVE
HCG UR QL: 0.2 EU/DL
HGB UR QL STRIP.AUTO: NEGATIVE
KETONES UR-MCNC: NEGATIVE
LEUKOCYTE ESTERASE UR QL STRIP: NEGATIVE
NITRITE UR QL STRIP: NEGATIVE
PH UR STRIP: 7
PROT UR STRIP-MCNC: NEGATIVE
SP GR UR STRIP: 1.01

## 2021-08-02 PROCEDURE — 99214 OFFICE O/P EST MOD 30 MIN: CPT | Mod: 25

## 2021-08-02 PROCEDURE — 81003 URINALYSIS AUTO W/O SCOPE: CPT | Mod: QW

## 2021-08-02 PROCEDURE — 36415 COLL VENOUS BLD VENIPUNCTURE: CPT

## 2021-08-02 RX ORDER — NIFEDIPINE 30 MG/1
30 TABLET, EXTENDED RELEASE ORAL
Qty: 90 | Refills: 0 | Status: ACTIVE | COMMUNITY
Start: 2021-06-09

## 2021-08-02 NOTE — ASSESSMENT
[FreeTextEntry1] : A 70  year old male with a past medical history as above who presents to our office to have blood pressure check and fasting labs

## 2021-08-02 NOTE — PHYSICAL EXAM
[Well Nourished] : well nourished [Well Developed] : well developed [Well-Appearing] : well-appearing [Normal Sclera/Conjunctiva] : normal sclera/conjunctiva [PERRL] : pupils equal round and reactive to light [EOMI] : extraocular movements intact [Normal Outer Ear/Nose] : the outer ears and nose were normal in appearance [Normal Oropharynx] : the oropharynx was normal [No JVD] : no jugular venous distention [No Lymphadenopathy] : no lymphadenopathy [Supple] : supple [Thyroid Normal, No Nodules] : the thyroid was normal and there were no nodules present [No Respiratory Distress] : no respiratory distress  [No Accessory Muscle Use] : no accessory muscle use [Clear to Auscultation] : lungs were clear to auscultation bilaterally [Normal Rate] : normal rate  [Regular Rhythm] : with a regular rhythm [Normal S1, S2] : normal S1 and S2 [No Carotid Bruits] : no carotid bruits [No Abdominal Bruit] : a ~M bruit was not heard ~T in the abdomen [No Varicosities] : no varicosities [Pedal Pulses Present] : the pedal pulses are present [No Edema] : there was no peripheral edema [No Palpable Aorta] : no palpable aorta [No Extremity Clubbing/Cyanosis] : no extremity clubbing/cyanosis [Soft] : abdomen soft [Non Tender] : non-tender [Non-distended] : non-distended [No Masses] : no abdominal mass palpated [No HSM] : no HSM [Normal Bowel Sounds] : normal bowel sounds [Normal Posterior Cervical Nodes] : no posterior cervical lymphadenopathy [Normal Anterior Cervical Nodes] : no anterior cervical lymphadenopathy [No CVA Tenderness] : no CVA  tenderness [No Spinal Tenderness] : no spinal tenderness [No Joint Swelling] : no joint swelling [Grossly Normal Strength/Tone] : grossly normal strength/tone [No Rash] : no rash [Thin Hair] : thin hair [Coordination Grossly Intact] : coordination grossly intact [No Focal Deficits] : no focal deficits [Normal Gait] : normal gait [Normal Affect] : the affect was normal [Normal Insight/Judgement] : insight and judgment were intact [Normal TMs] : both tympanic membranes were normal [Deep Tendon Reflexes (DTR)] : deep tendon reflexes were 2+ and symmetric [Speech Grossly Normal] : speech grossly normal [Alert and Oriented x3] : oriented to person, place, and time [Normal Mood] : the mood was normal [de-identified] : wearing one hearing aid  [de-identified] : with murmur  [de-identified] : fistula left upper ext

## 2021-08-02 NOTE — HEALTH RISK ASSESSMENT
[1 or 2 (0 pts)] : 1 or 2 (0 points) [Never (0 pts)] : Never (0 points) [No] : In the past 12 months have you used drugs other than those required for medical reasons? No [No falls in past year] : Patient reported no falls in the past year [0] : 2) Feeling down, depressed, or hopeless: Not at all (0) [Independent] : managing finances [] : No [Audit-CScore] : 0 [de-identified] : Stationary bike.  [[id number]] : 0

## 2021-08-02 NOTE — PLAN
Dr. Aviles,  Below is the pathology report and operative report for the patient we discussed. Thank you.      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     See note and staging summary below.     - Tumor involves circumferential/radial margin in multiple foci.     - Tumor involves lower uterine segment.     - Lymphovascular invasion.     - Inactive endometrium.      UTERINE CERVIX, Resection     SPECIMEN          Procedure:   Simple hysterectomy, Bilateral salpingectomy     Hysterectomy Type:   Not specified     TUMOR          Tumor Site:   Left superior (anterior) quadrant (12 to 3 o'clock), Left     inferior (posterior) quadrant (3 to 6 o'clock), Right inferior (posterior)     quadrant (6 to 9 o'clock), Right superior (anterior) quadrant (9 to 12     o'clock)     Tumor Size:   3.3 x 2.8 x 1.5 cm     Histologic Type:   Squamous cell carcinoma, NOS     Histologic Grade:   G2: Moderately differentiated     Stromal Invasion          Depth of Stromal Invasion (Millimeters):   15 mm     Depth of Stromal Invasion:   Deep one-third     Longitudinal Horizontal Extent / Length of Stromal Invasion (Millimeters):       28 mm     Circumferential Horizontal Extent / Width of Stromal Invasion  (Millimeters):      51 mm     Other Tissue / Organ Involvement:   Not applicable     Lymphovascular Invasion:   Present     MARGINS          Ectocervical Margin:   Uninvolved by invasive carcinoma          Distance of Invasive Carcinoma from Margin (Millimeters):   1.0 mm     Location:   3 o'clock - 6 o'clock     Status of Intraepithelial Neoplasia at Margin:   Uninvolved by intraepithelial     neoplasia          Radial (Circumferential) Margin:   Involved by invasive carcinoma          Location:   12 - 3 o'clock, 9 - 12 o'clock          Endocervical Margin / Lower Uterine  Segment Margin:   Involved by     invasive carcinoma          Location:   Anterior     LYMPH NODES          Regional Lymph Nodes:   No lymph nodes submitted or found     PATHOLOGIC STAGE CLASSIFICATION (pTNM, AJCC 8th Edition)          Primary Tumor (pT):   pT3b: Tumor extending to the pelvic wall and / or     causing hydronephrosis or non-functioning kidney     Regional Lymph Nodes (pN):   pNX: Regional lymph nodes cannot be assessed     FIGO STAGE          FIGO Stage:   IIIB: Extension onto the pelvic sidewall, and / or causing     hydronephrosis / nonfunctioning kidney (unless known to be due to another     cause)     ADDITIONAL FINDINGS          Additional Findings:   None identified           Operative Report     Pre-Operative Diagnosis: pelvic pain, heavy menstrual bleeding, possible endometriosis  Post-Operative Diagnosis: pelvic adhesions, R hydroureter and stenosis  Procedure:  Total laparoscopic hysterectomy, bilateral salpingectomy, cystoscopy, lysis of pelvic adhesions, R ureterolysis  Attending:  Marguerite Guillen MD  Resident / Assist:  Sarai Michaud MD PGY-4  Anesthesia: General endotracheal tube   EBL:  300cc  UOP: 300cc, clear and yellow  Antibiotics: Ancef 2g, dosed twice  Blood Products: none  Grafts/Implants: none  Complications: none  Specimen: Uterus, cervix, bilateral fallopian tubes     Operative findings:    -omental adhesions to the anterior abdominal wall  -grossly normal bilateral fallopian tubes and ovaries  -uterus sounded to 7cm  -a retroperitoneal structure adjacent to the cervix, palpable on vaginal exam and visible during laparoscopy, on the right  -normal liver, gallbladder, appendix, bowel, stomach  -Normal bladder and left ureter with ureteral jet, right hydroureter and stenotic right ureteral orifice  -intraoperative consultation was sought, Dr. Gera Traore performed cystoscopy and attempted right ureteral stenting, please refer to his dictation for details     Disposition: stable  to recovery  Procedure:      The patient was taken to the operating room with IV fluids running.  She was placed supine on the operating table where general anesthesia was adminstered and found to be adequate.  She was placed in dorsal lithotomy position and her arms were tucked at her sides.  Her arms and shoulders were protected with pink foam padding.  Her vagina was prepped with betadyne and her anterior abdominal wall was prepped with chloraprep.  She was draped with sterile towels and drapes in the normal fashion.  A time out was performed and the correct patient, operative site, procedure and antibiotics were confirmed with the entire operative team.     A hansen was placed through her urethra and into her bladder using sterile technique.  The anterior lip of her cervix was visualized and grasped with a single tooth tenaculum.  Her uterus was sounded to 7cm. The large size VCare uterine manipulator was inserted into her uterus to the fundus and the balloon was inflated.  The instruments were removed from the vagina and the cervical cup was placed flush against the cervix.  The vaginal occluder was now placed flush against the cervical cup and tightened into place.  The patients legs were now extended at the hips and flexed at the knees.     Gloves and gowns were now changed.  Attention was directed to the anterior abdominal wall. Her umbilicus was everted with a Kocher clamp and towel clips were applied. A 5mm incision was made in the umbilicus and the veress needle was inserted for an opening pressure of 5mm Hg. Her abdomen was now insufflated to 15mm Hg with CO2 and the veress needle was removed. A 5 mm trocar was inserted under direct visualization. Two lateral 5mm trocars were placed, one on the right and one on the left, under direct visualization and without difficulty. The prior mentioned findings were noted. Omental adhesions were removed from the anterior abdominal wall.     The right hydroureter was  apparent prior to the start of the dissection. Starting on the left, the bilateral fallopian tubes were grasped, coagulated and transected from the mesosalpinx using the Harmonic ace. The utero-ovarian ligaments were then grasped coagulated and transected parallel to the round ligament. The round ligaments were now grasped, coagulated and transected and the anterior and posterior leaves of the broad ligament were opened.  The anterior leaf was carefully taken down and the vesicouterine peritoneum was dissected, creating a bladder flap. Due to adhesions from her prior cesareans, the bladder was backfilled with 150cc of sterile water to delineate the border of the vesicouterine fold. The posterior leaf of the broad ligament was taken down to the level of the uterosacral ligaments and the uterine vessles were skeletonized.  The uterine vessels were the grasped, coagulated and transected and dropped laterally.  Good hemostasis was noted.  A colpotomy was then created circumferentially at the level of the cervicovaginal juction using the VCare cervical cup as guidance.  Once completely transected, the uterus, cervix, and bilateral fallopian tubes were removed through the colpotomy through the vagina.  A sterile lap was placed in the vagina to aid in maintaining pneumoperitoneum.  Hemostasis was assured at the level of the vaginal cuff.  The vaginal cuff was closed using 0-PDS suture in interrupted mattress style stitches.  Four sutures were used in total.  The vaginal cuff was palpated and excellent closure was noted.  The pelvis was copiously irrigated.  The pedicles were inspected again and found to be hemostatic.  The pelvis was now irrigated and suctioned, and good hemostasis remained at a low pressure of 5mm Hg.      Cystoscopy was performed with a 70 degree cystoscope after pyridium was administered pre-operatively.  Orange stained dye was seen from the left ureter on cystoscopy.  The bladder was inspected and  there was no abnormalities and no injurys noted in the bladder. The right ureteral orifice appeared stenotic, swollen, and raised. We sought consultation with urology for possible stenting, please refer to Dr. Traore's dictation for that portion of the case. In the meantime, right ureterolysis was performed by tenting the peritoneum away from the side wall and incising with scissors. The right ureter was noted to appear dilated and dusky without any vermiculation. Dissection was performed to the level of the cardinal uterosacral complex, past the point where the ureter passed under the cut uterine vessels and just prior to its upward trajectory to enter the bladder. No injury or transection was apparent.     With Dr. Traore's assessment and the finding of ureteral stenosis at the level of the bladder the decision was made to proceed with further assessment with imaging on POD#1.     The gas was allowed to escape from the abdomen and the trocars were removed from the abdomen.  The skin was closed using 4-0 monocryl sutures in a subcuticular fashion.  Sterile dressing were placed over the incisions. A new hansen catheter was placed prior to patient awakening.     The patient was undraped and placed supine on the operating table.  She was awakened from anesthesia and extubated without difficulty in the operating room.  She was taken to the post-anesthesia care unit in stable and satisfactory condition.       All counts were correct at the end of the procedure.   [FreeTextEntry1] : Cardiology\par hypertension - continue Labetalol HCl 200 mg BID p.o.q.d. and Clonidine HCl 0.2 mg BID p.o.q.d. - continue low sodium diet.\par Request  a  of recent labs done by his nephrologist \par Reviewed recent echo and cardia consult see scans \par  \par hyperlipidemia - Continue Simvastatin 20 mg p.o.q.d. - continue low cholesterol/low fat diet \par CAD - s/p stenting (LAD) - continue Aspirin 81 mg p.o.q.d; continue Simvastatin 20 mg p.o.q.d. - continue to follow up with cardiologist, Dr. Cheung \par \par Nephrology\par CKD - s/p kidney transplant - continue Tacrolimus BID p.o.q.d. as directed, CellCept 50 0mg QID p.o.q.d. as directed, and Prednisone 5mg p.o.q.d. - continue to follow up with transplant nephrologist at [facility].\par Check covid spike ab \par \par Endocrinology\par continue Vitamin D 2000 IU p.o.q.d. with meals \par \par Urology\par BPH - continue Tamsulosin HCl 0.4mg p.o.q.h.s. \par \par Patient  education  - COVID-19   Counseling and education provided to the patient.  Advised sign and symptoms of the virus.  Advised contact precautions.  Educated patient on proper hand washing and to participate in social distancing. Check covid ab \par \par Patient is in full awareness of the plan and agrees to it.  All pt question was answered.  \par \par  \par

## 2021-08-02 NOTE — COUNSELING
[AUDIT-C Screening administered and reviewed] : AUDIT-C Screening administered and reviewed [Fall prevention counseling provided] : Fall prevention counseling provided

## 2021-08-02 NOTE — HISTORY OF PRESENT ILLNESS
[FreeTextEntry1] : BP  check and fasting labs  [de-identified] : Mr. MARINA is a 70 year  male, who present to the office for blood pressure check and fasting labs.\par States he recently saw cardio  Dr. Dumont and exam was normal.\par S/p renal transplant \par Completed covid vax \[place]

## 2021-08-09 ENCOUNTER — NON-APPOINTMENT (OUTPATIENT)
Age: 71
End: 2021-08-09

## 2021-08-09 LAB
25(OH)D3 SERPL-MCNC: 37.1 NG/ML
ALBUMIN SERPL ELPH-MCNC: 4.6 G/DL
ALP BLD-CCNC: 82 U/L
ALT SERPL-CCNC: 15 U/L
ANION GAP SERPL CALC-SCNC: 11 MMOL/L
AST SERPL-CCNC: 18 U/L
BASOPHILS # BLD AUTO: 0.04 K/UL
BASOPHILS NFR BLD AUTO: 0.6 %
BILIRUB SERPL-MCNC: 0.5 MG/DL
BUN SERPL-MCNC: 14 MG/DL
CALCIUM SERPL-MCNC: 9.3 MG/DL
CHLORIDE SERPL-SCNC: 105 MMOL/L
CHOLEST SERPL-MCNC: 179 MG/DL
CO2 SERPL-SCNC: 24 MMOL/L
COVID-19 SPIKE DOMAIN ANTIBODY INTERPRETATION: POSITIVE
CREAT SERPL-MCNC: 0.84 MG/DL
EOSINOPHIL # BLD AUTO: 0.12 K/UL
EOSINOPHIL NFR BLD AUTO: 1.8 %
GLUCOSE SERPL-MCNC: 114 MG/DL
HCT VFR BLD CALC: 46.3 %
HDLC SERPL-MCNC: 68 MG/DL
HGB BLD-MCNC: 14.4 G/DL
IMM GRANULOCYTES NFR BLD AUTO: 0.5 %
LDLC SERPL CALC-MCNC: 90 MG/DL
LYMPHOCYTES # BLD AUTO: 1.79 K/UL
LYMPHOCYTES NFR BLD AUTO: 27.3 %
MAN DIFF?: NORMAL
MCHC RBC-ENTMCNC: 31.1 GM/DL
MCHC RBC-ENTMCNC: 31.9 PG
MCV RBC AUTO: 102.4 FL
MONOCYTES # BLD AUTO: 0.54 K/UL
MONOCYTES NFR BLD AUTO: 8.2 %
NEUTROPHILS # BLD AUTO: 4.03 K/UL
NEUTROPHILS NFR BLD AUTO: 61.6 %
NONHDLC SERPL-MCNC: 111 MG/DL
PLATELET # BLD AUTO: 174 K/UL
POTASSIUM SERPL-SCNC: 4.1 MMOL/L
PROT SERPL-MCNC: 6.4 G/DL
RBC # BLD: 4.52 M/UL
RBC # FLD: 14 %
SARS-COV-2 AB SERPL IA-ACNC: 4.51 U/ML
SODIUM SERPL-SCNC: 141 MMOL/L
TRIGL SERPL-MCNC: 104 MG/DL
TSH SERPL-ACNC: 0.83 UIU/ML
WBC # FLD AUTO: 6.55 K/UL

## 2021-11-09 DIAGNOSIS — Z12.5 ENCOUNTER FOR SCREENING FOR MALIGNANT NEOPLASM OF PROSTATE: ICD-10-CM

## 2021-11-09 DIAGNOSIS — Z13.21 ENCOUNTER FOR SCREENING FOR NUTRITIONAL DISORDER: ICD-10-CM

## 2021-11-10 ENCOUNTER — APPOINTMENT (OUTPATIENT)
Dept: INTERNAL MEDICINE | Facility: CLINIC | Age: 71
End: 2021-11-10
Payer: MEDICARE

## 2021-11-10 VITALS
SYSTOLIC BLOOD PRESSURE: 132 MMHG | BODY MASS INDEX: 23.82 KG/M2 | DIASTOLIC BLOOD PRESSURE: 62 MMHG | HEIGHT: 66 IN | TEMPERATURE: 97.1 F | OXYGEN SATURATION: 98 % | HEART RATE: 60 BPM | WEIGHT: 148.19 LBS | RESPIRATION RATE: 16 BRPM

## 2021-11-10 PROCEDURE — 99214 OFFICE O/P EST MOD 30 MIN: CPT

## 2021-11-12 ENCOUNTER — LABORATORY RESULT (OUTPATIENT)
Age: 71
End: 2021-11-12

## 2021-11-12 NOTE — HEALTH RISK ASSESSMENT
[Fully functional (bathing, dressing, toileting, transferring, walking, feeding)] : Fully functional (bathing, dressing, toileting, transferring, walking, feeding) [Fully functional (using the telephone, shopping, preparing meals, housekeeping, doing laundry, using] : Fully functional and needs no help or supervision to perform IADLs (using the telephone, shopping, preparing meals, housekeeping, doing laundry, using transportation, managing medications and managing finances) [No] : In the past 12 months have you used drugs other than those required for medical reasons? No [No falls in past year] : Patient reported no falls in the past year [0] : 2) Feeling down, depressed, or hopeless: Not at all (0) [PHQ-2 Negative - No further assessment needed] : PHQ-2 Negative - No further assessment needed [ColonoscopyDate] : 05/17 [ColonoscopyComments] : Diverticulosis of sigmoid colon, multiple polyps, (sigmoid colon, descending colon, ascending colon, cecum), hemorrhoids. [] : No [Audit-CScore] : 0 [de-identified] : Walking 3000 steps daily.  [[id number]] : 0

## 2021-11-12 NOTE — ASSESSMENT
[FreeTextEntry1] : Patient is a 71 year old male with a past medical history as above who presents for Rx for fasting blood work and general follow-up.

## 2021-11-12 NOTE — ADDENDUM
[FreeTextEntry1] : I, Aj Veloz, acted solely as scribe for Dr. Reese Clayton DO on this date 11/10/2021 10:00AM .\par \par All medical record entries made by the Scribe were at my, Dr. Reese Clayton DO direction and personally dictated by me on 11/10/2021 10:00AM. I have reviewed the chart and agree that the record accurately reflects my personal performance of the history, physical exam, assessment and plan. I have also personally directed, reviewed and agreed with the chart.\par

## 2021-11-12 NOTE — PLAN
[FreeTextEntry1] : Cardiology\par hypertension - continue Labetalol HCl 300mg BID p.o.q.d. as directed, Nifedipine ER 30mg p.o. as directed, and Clonidine HCl 0.2mg BID p.o.q.d. as directed - continue low sodium diet; will continue to monitor BP\par hyperlipidemia - continue Simvastatin 20mg p.o.q.d. as directed - continue low cholesterol/low fat diet - check FLP \par CAD - s/p stent (LAD) - continue Aspirin 81mg p.o.q.d. as directed; continue Simvastatin 20mg p.o.q.d. as directed \par Previously referred to cardiologist, Dr. Norton \par Endocrinology\par hyperglycemia - continue low carbohydrate/low sugar diet - check hemoglobin A1C\par Nephrology\par CKD - s/p kidney transplant in 2019 - continue Tacrolimus 1mg BID p.o.q.d. as directed (1mg in AM, 1.5mg in PM), CellCept 500mg BID p.o.q.d. as directed, and Prednisone 5mg p.o.q.d. as directed - continue to follow up with transplant nephrologist at [facility] \par Endocrinology\par Continue Vitamin D 2000 IU p.o.q.d. with meals as directed - check Vitamin D \par Gastroenterology\par constipation - continue Colace 100mg BID p.o. as directed \par history of esophageal reflux - previously recommended OTC Tums p.o. p.r.n. as directed - continue antireflux measures\par Urology\par BPH - continue Tamsulosin HCl 0.4mg p.o.q.h.s. as directed - check PSA\par Immunization\par flu vaccine - evidence of vaccine administration to be requested\par \par Rx given for blood work (FLP, hemoglobin A1C, TSH, PSA, and Vitamin D); recommended following up at a [facility].

## 2021-11-12 NOTE — HISTORY OF PRESENT ILLNESS
[FreeTextEntry1] : Rx for fasting blood work and general follow-up\par  [de-identified] : Patient is a 71 year old male with a past medical history as below who presents for Rx for fasting blood work and general follow-up. Patient states he is taking all medications as prescribed and denies any adverse reactions or side effects. He denies lightheadedness or dizziness on Labetalol, Nifedipine, and Clonidine. Patient regularly follows up with the transplant nephrologist at [facility] given history of kidney transplantation. He gets home blood draws monthly (ordered by nephrologist). Blood work done on 11/9/21 revealed normal CBC, normal CMP (including BUN 11, Creatinine 0.88, blood-glucose 77, and normal LFTs), normal Serum Magnesium (2.0), and normal Tacrolimus Level (5.1). UA dated 11/9/21 was negative. Blood work done on 8/2/21 revealed elevated Ferritin (942). Patient has never seen a hematologist. Patient notes back pain. He states he sees a chiropractor for adjustments. Patient has not been maintaining a well-balanced diet, but has been trying to increase CV activity (walking 3000 steps daily). Patient has already received the flu vaccine for this season. He has received 3 doses of the COVID-19 Vaccine (Moderna).

## 2021-11-20 ENCOUNTER — NON-APPOINTMENT (OUTPATIENT)
Age: 71
End: 2021-11-20

## 2022-04-26 ENCOUNTER — APPOINTMENT (OUTPATIENT)
Dept: INTERNAL MEDICINE | Facility: CLINIC | Age: 72
End: 2022-04-26
Payer: MEDICARE

## 2022-04-26 VITALS
HEART RATE: 62 BPM | RESPIRATION RATE: 16 BRPM | HEIGHT: 66 IN | BODY MASS INDEX: 23.95 KG/M2 | DIASTOLIC BLOOD PRESSURE: 66 MMHG | SYSTOLIC BLOOD PRESSURE: 132 MMHG | OXYGEN SATURATION: 98 % | WEIGHT: 149 LBS | TEMPERATURE: 98.2 F

## 2022-04-26 DIAGNOSIS — F17.210 NICOTINE DEPENDENCE, CIGARETTES, UNCOMPLICATED: ICD-10-CM

## 2022-04-26 DIAGNOSIS — Z00.00 ENCOUNTER FOR GENERAL ADULT MEDICAL EXAMINATION W/OUT ABNORMAL FINDINGS: ICD-10-CM

## 2022-04-26 PROCEDURE — G0439: CPT

## 2022-04-26 PROCEDURE — 36415 COLL VENOUS BLD VENIPUNCTURE: CPT

## 2022-04-26 RX ORDER — TACROLIMUS 1 MG/1
1 CAPSULE ORAL
Qty: 180 | Refills: 0 | Status: ACTIVE | COMMUNITY

## 2022-04-26 RX ORDER — MYCOPHENOLATE MOFETIL 500 MG/1
500 TABLET ORAL TWICE DAILY
Qty: 360 | Refills: 0 | Status: ACTIVE | COMMUNITY
Start: 2022-04-26

## 2022-04-26 RX ORDER — LABETALOL HYDROCHLORIDE 200 MG/1
200 TABLET, FILM COATED ORAL
Qty: 270 | Refills: 0 | Status: ACTIVE | COMMUNITY
Start: 2021-06-09

## 2022-04-26 RX ORDER — PANTOPRAZOLE 20 MG/1
20 TABLET, DELAYED RELEASE ORAL
Qty: 30 | Refills: 0 | Status: DISCONTINUED | COMMUNITY
Start: 2020-09-25 | End: 2022-04-26

## 2022-04-26 NOTE — PHYSICAL EXAM
[No Acute Distress] : no acute distress [Well Nourished] : well nourished [Well Developed] : well developed [Well-Appearing] : well-appearing [Normal Sclera/Conjunctiva] : normal sclera/conjunctiva [PERRL] : pupils equal round and reactive to light [EOMI] : extraocular movements intact [Normal Outer Ear/Nose] : the outer ears and nose were normal in appearance [Normal Oropharynx] : the oropharynx was normal [No JVD] : no jugular venous distention [No Lymphadenopathy] : no lymphadenopathy [Supple] : supple [Thyroid Normal, No Nodules] : the thyroid was normal and there were no nodules present [No Respiratory Distress] : no respiratory distress  [No Accessory Muscle Use] : no accessory muscle use [Normal Rate] : normal rate  [Regular Rhythm] : with a regular rhythm [Normal S1, S2] : normal S1 and S2 [No Carotid Bruits] : no carotid bruits [No Abdominal Bruit] : a ~M bruit was not heard ~T in the abdomen [No Varicosities] : no varicosities [Pedal Pulses Present] : the pedal pulses are present [No Palpable Aorta] : no palpable aorta [No Extremity Clubbing/Cyanosis] : no extremity clubbing/cyanosis [Soft] : abdomen soft [Non Tender] : non-tender [Non-distended] : non-distended [No Masses] : no abdominal mass palpated [No HSM] : no HSM [Normal Bowel Sounds] : normal bowel sounds [Normal Posterior Cervical Nodes] : no posterior cervical lymphadenopathy [Normal Anterior Cervical Nodes] : no anterior cervical lymphadenopathy [No CVA Tenderness] : no CVA  tenderness [No Spinal Tenderness] : no spinal tenderness [No Joint Swelling] : no joint swelling [Grossly Normal Strength/Tone] : grossly normal strength/tone [No Rash] : no rash [Coordination Grossly Intact] : coordination grossly intact [No Focal Deficits] : no focal deficits [Normal Gait] : normal gait [Deep Tendon Reflexes (DTR)] : deep tendon reflexes were 2+ and symmetric [Normal Affect] : the affect was normal [Normal Insight/Judgement] : insight and judgment were intact [Normal Voice/Communication] : normal voice/communication [Normal TMs] : both tympanic membranes were normal [Clear to Auscultation] : lungs were clear to auscultation bilaterally [Normal Supraclavicular Nodes] : no supraclavicular lymphadenopathy [Kyphosis] : no kyphosis [Scoliosis] : no scoliosis [No Skin Lesions] : no skin lesions [Speech Grossly Normal] : speech grossly normal [Memory Grossly Normal] : memory grossly normal [Alert and Oriented x3] : oriented to person, place, and time [Normal Mood] : the mood was normal [de-identified] : hearing aides bilaterally (removed for exam) [de-identified] : decreased breath sounds diffusely bilaterally (chronic); no rhonchi, rales, wheezes  [de-identified] : 2/6 systolic ejection murmur heard bilaterally [de-identified] : \par trace pretibial edema bilaterally  [FreeTextEntry1] : done by Dr. Jauregui

## 2022-04-26 NOTE — PLAN
[FreeTextEntry1] : Pulmonary\par occasional light smoker - discussed the importance of smoking cessation in reducing risk for lung cancer \par Vascular\par lower extremity edema - recommended keeping legs elevated when at home; continue low sodium diet\par Cardiology\par hypertension - continue Labetalol HCl 300mg BID p.o.q.d. as directed, Nifedipine ER 30mg p.o. as directed, and Clonidine HCl 0.2mg BID p.o.q.d. as directed - continue low sodium diet; will continue to monitor BP\par hyperlipidemia - continue Simvastatin 20mg p.o.q.d. as directed - continue low cholesterol/low fat diet - check FLP and LFTs \par CAD - s/p stent (LAD) - continue Aspirin 81mg p.o.q.d. as directed; continue Simvastatin 20mg p.o.q.d. as directed \par Continue to follow up with cardiologist, Dr. Cheung \par Endocrinology\par hyperglycemia - continue low carbohydrate/low sugar diet - check hemoglobin A1C\par Nephrology\par CKD - s/p kidney transplant in 2019 - continue Tacrolimus 1mg BID p.o.q.d. as directed (1mg in AM, 1mg in PM), CellCept 1000mg BID p.o.q.d. as directed, and Prednisone 5mg p.o.q.d. as directed - check CMP - continue to follow up with transplant nephrologist at [facility] \par Endocrinology\par Continue Vitamin D 2000 IU p.o.q.d. with a meal as directed - check Vitamin D \par Gastroenterology\par Results of most recent screening colonoscopy to be requested from gastroenterologist, Dr. Jauregui's office\par constipation - continue Colace 100mg BID p.o. as directed \par history of esophageal reflux - previously recommended OTC Tums p.o. p.r.n. as directed - continue antireflux measures\par Urology\par BPH - continue Tamsulosin HCl 0.4mg p.o.q.h.s. as directed - check PSA\par Immunization\par Shingrix - discussed, patient to determine if vaccine is covered under medical benefits of current insurance plan and follow up for 1st dose if interested; otherwise, instructed to follow up at the pharmacy for vaccine administration\par \par check male panel, hemoglobin A1C, and UA w/ Reflex Urine Culture; unable to perform EKG secondary to issues with [employer]. \par RTO in 6 months for repeat lab work.

## 2022-04-26 NOTE — HEALTH RISK ASSESSMENT
[No] : In the past 12 months have you used drugs other than those required for medical reasons? No [No falls in past year] : Patient reported no falls in the past year [0] : 2) Feeling down, depressed, or hopeless: Not at all (0) [PHQ-2 Negative - No further assessment needed] : PHQ-2 Negative - No further assessment needed [Fully functional (bathing, dressing, toileting, transferring, walking, feeding)] : Fully functional (bathing, dressing, toileting, transferring, walking, feeding) [Fully functional (using the telephone, shopping, preparing meals, housekeeping, doing laundry, using] : Fully functional and needs no help or supervision to perform IADLs (using the telephone, shopping, preparing meals, housekeeping, doing laundry, using transportation, managing medications and managing finances) [Current] : Current [Yes] : Yes [Monthly or less (1 pt)] : Monthly or less (1 point) [1 or 2 (0 pts)] : 1 or 2 (0 points) [Never (0 pts)] : Never (0 points) [de-identified] : Infrequent, usually 2-3 puffs of a cigarette.  [Audit-CScore] : 1 [de-identified] : Walking 3000 steps daily.  [[id number]] : 0 [ColonoscopyDate] : 05/17 [ColonoscopyComments] : Diverticulosis of sigmoid colon, multiple polyps, (sigmoid colon, descending colon, ascending colon, cecum), hemorrhoids; Results of most recent screening to be requested from Dr. Jauregui's office.

## 2022-04-26 NOTE — ASSESSMENT
[FreeTextEntry1] : Patient is a 71 year old male with a past medical history as above who presents for an annual wellness visit.\par

## 2022-04-26 NOTE — HISTORY OF PRESENT ILLNESS
[FreeTextEntry1] : annual wellness visit [de-identified] : Patient is a 71 year old male with a past medical history as below who presents for an annual wellness visit. Patient states he is taking all medications as prescribed and denies any adverse reactions or side effects. He denies lightheadedness or dizziness on Labetalol, Nifedipine, and Clonidine. He denies any new arthralgias or myalgias on Simvastatin. Patient's last screening colonoscopy was in 2020 with gastroenterologist, Dr. Jauregui. Patient is using hearing aids. Patient received the flu vaccine for this season at [facility]. He received the Prevnar 13 on 3/4/18. He received the Pneumovax 23 on 10/7/20. He received the Tdap (Adacel) Vaccine on 11/13/15. He has not received the Shingles (Shingrix) Vaccine. He is vaccinated against COVID-19 Vaccine (Moderna). He gets bloodwork done monthly by his transplant nephrologist to check his anti rejection drug levels. Patient smokes approximately 1 cigarette daily and drinks 1-2 glasses of wine weekly.\par \par Patient sees cardiologist, Dr. Cheung given history of HTN, HLD, and CAD: s/p stent placement (LAD). \par \par Patient has a home blood draw monthly given history of kidney transplantation; his last Creatinine was 0.84 and potassium WNL; regularly follows up with his transplant nephrologist at [facility].

## 2022-04-26 NOTE — ADDENDUM
[FreeTextEntry1] : I, Aj Veloz, acted solely as scribe for Dr. Reese Clayton DO on this date 04/26/2022  9:00AM .\par \par All medical record entries made by the Scribe were at my, Dr. Reese Clayton DO direction and personally dictated by me on 04/26/2022  9:00AM. I have reviewed the chart and agree that the record accurately reflects my personal performance of the history, physical exam, assessment and plan. I have also personally directed, reviewed and agreed with the chart.

## 2022-04-28 ENCOUNTER — NON-APPOINTMENT (OUTPATIENT)
Age: 72
End: 2022-04-28

## 2022-04-28 LAB
25(OH)D3 SERPL-MCNC: 38.1 NG/ML
ALBUMIN SERPL ELPH-MCNC: 4.6 G/DL
ALP BLD-CCNC: 87 U/L
ALT SERPL-CCNC: 15 U/L
ANION GAP SERPL CALC-SCNC: 13 MMOL/L
APPEARANCE: CLEAR
AST SERPL-CCNC: 16 U/L
BASOPHILS # BLD AUTO: 0.05 K/UL
BASOPHILS NFR BLD AUTO: 0.7 %
BILIRUB SERPL-MCNC: 0.4 MG/DL
BILIRUBIN URINE: NEGATIVE
BLOOD URINE: NEGATIVE
BUN SERPL-MCNC: 13 MG/DL
CALCIUM SERPL-MCNC: 9.9 MG/DL
CHLORIDE SERPL-SCNC: 103 MMOL/L
CHOLEST SERPL-MCNC: 182 MG/DL
CO2 SERPL-SCNC: 25 MMOL/L
COLOR: NORMAL
CREAT SERPL-MCNC: 0.85 MG/DL
EGFR: 92 ML/MIN/1.73M2
EOSINOPHIL # BLD AUTO: 0.24 K/UL
EOSINOPHIL NFR BLD AUTO: 3.2 %
ESTIMATED AVERAGE GLUCOSE: 123 MG/DL
GLUCOSE QUALITATIVE U: NEGATIVE
GLUCOSE SERPL-MCNC: 105 MG/DL
HBA1C MFR BLD HPLC: 5.9 %
HCT VFR BLD CALC: 44.9 %
HDLC SERPL-MCNC: 65 MG/DL
HGB BLD-MCNC: 13.9 G/DL
IMM GRANULOCYTES NFR BLD AUTO: 0.7 %
KETONES URINE: NEGATIVE
LDLC SERPL CALC-MCNC: 85 MG/DL
LEUKOCYTE ESTERASE URINE: NEGATIVE
LYMPHOCYTES # BLD AUTO: 2.59 K/UL
LYMPHOCYTES NFR BLD AUTO: 34.8 %
MAN DIFF?: NORMAL
MCHC RBC-ENTMCNC: 30.2 PG
MCHC RBC-ENTMCNC: 31 GM/DL
MCV RBC AUTO: 97.4 FL
MONOCYTES # BLD AUTO: 0.63 K/UL
MONOCYTES NFR BLD AUTO: 8.5 %
NEUTROPHILS # BLD AUTO: 3.88 K/UL
NEUTROPHILS NFR BLD AUTO: 52.1 %
NITRITE URINE: NEGATIVE
NONHDLC SERPL-MCNC: 118 MG/DL
PH URINE: 7
PLATELET # BLD AUTO: 198 K/UL
POTASSIUM SERPL-SCNC: 4.6 MMOL/L
PROT SERPL-MCNC: 6.5 G/DL
PROTEIN URINE: NEGATIVE
PSA SERPL-MCNC: 0.67 NG/ML
RBC # BLD: 4.61 M/UL
RBC # FLD: 14.5 %
SODIUM SERPL-SCNC: 141 MMOL/L
SPECIFIC GRAVITY URINE: 1.01
TRIGL SERPL-MCNC: 165 MG/DL
TSH SERPL-ACNC: 0.89 UIU/ML
UROBILINOGEN URINE: NORMAL
WBC # FLD AUTO: 7.44 K/UL

## 2022-06-07 ENCOUNTER — APPOINTMENT (OUTPATIENT)
Dept: INTERNAL MEDICINE | Facility: CLINIC | Age: 72
End: 2022-06-07

## 2022-08-05 ENCOUNTER — APPOINTMENT (OUTPATIENT)
Dept: INTERNAL MEDICINE | Facility: CLINIC | Age: 72
End: 2022-08-05

## 2022-08-05 VITALS
HEIGHT: 66 IN | HEART RATE: 61 BPM | SYSTOLIC BLOOD PRESSURE: 158 MMHG | BODY MASS INDEX: 23.46 KG/M2 | WEIGHT: 146 LBS | TEMPERATURE: 98 F | RESPIRATION RATE: 14 BRPM | DIASTOLIC BLOOD PRESSURE: 60 MMHG | OXYGEN SATURATION: 96 %

## 2022-08-05 VITALS — DIASTOLIC BLOOD PRESSURE: 60 MMHG | SYSTOLIC BLOOD PRESSURE: 140 MMHG

## 2022-08-05 DIAGNOSIS — R26.81 UNSTEADINESS ON FEET: ICD-10-CM

## 2022-08-05 PROCEDURE — 99214 OFFICE O/P EST MOD 30 MIN: CPT

## 2022-08-05 NOTE — ADDENDUM
[FreeTextEntry1] : I, Aj Veloz, acted solely as scribe for Dr. Reese Clayton DO on this date 08/05/2022 11:10AM .\par \par All medical record entries made by the Scribe were at my, Dr. Reese Clayton DO direction and personally dictated by me on 08/05/2022 11:10AM. I have reviewed the chart and agree that the record accurately reflects my personal performance of the history, physical exam, assessment and plan. I have also personally directed, reviewed and agreed with the chart.

## 2022-08-05 NOTE — HISTORY OF PRESENT ILLNESS
[Spouse] : spouse [FreeTextEntry1] : general follow-up [de-identified] : Patient is a 71 year old male with a past medical history as below who presents for general follow-up. Patient states he is taking all medications as prescribed. He denies lightheadedness or dizziness on Labetalol, Nifedipine, and Clonidine. He denies any new arthralgias or myalgias on Simvastatin. Patient has noted easy bruising on Aspirin. Patient notes intermittently seeing a chiropractor for adjustments. Patient has noted gait instability, particularly when walking on an incline. He inquires about obtaining Rx for cane and shower chair.

## 2022-08-05 NOTE — ASSESSMENT
[FreeTextEntry1] : Patient is a 71 year old male with a past medical history as above who presents for general follow-up.

## 2022-08-05 NOTE — HEALTH RISK ASSESSMENT
[Current] : Current [Yes] : Yes [Monthly or less (1 pt)] : Monthly or less (1 point) [1 or 2 (0 pts)] : 1 or 2 (0 points) [Never (0 pts)] : Never (0 points) [No] : In the past 12 months have you used drugs other than those required for medical reasons? No [No falls in past year] : Patient reported no falls in the past year [0] : 2) Feeling down, depressed, or hopeless: Not at all (0) [PHQ-2 Negative - No further assessment needed] : PHQ-2 Negative - No further assessment needed [Fully functional (bathing, dressing, toileting, transferring, walking, feeding)] : Fully functional (bathing, dressing, toileting, transferring, walking, feeding) [Fully functional (using the telephone, shopping, preparing meals, housekeeping, doing laundry, using] : Fully functional and needs no help or supervision to perform IADLs (using the telephone, shopping, preparing meals, housekeeping, doing laundry, using transportation, managing medications and managing finances) [de-identified] : Infrequent, usually 2-3 puffs of a cigarette.  [Audit-CScore] : 1 [de-identified] : Walking 3000 steps daily.  [[id number]] : 0 [ColonoscopyDate] : 05/17 [ColonoscopyComments] : Diverticulosis of sigmoid colon, multiple polyps, (sigmoid colon, descending colon, ascending colon, cecum), hemorrhoids; Results of most recent screening to be requested from Dr. Jauregui's office.

## 2022-08-05 NOTE — PLAN
[FreeTextEntry1] : Pulmonary\par occasional light smoker - previously discussed the importance of smoking cessation in reducing risk for lung cancer \par Cardiology\par hypertension - continue Labetalol HCl 300mg BID p.o.q.d. as directed, Nifedipine ER 30mg p.o. as directed, and Clonidine HCl 0.2mg BID p.o.q.d. as directed - advised low sodium diet; will continue to monitor BP\par hyperlipidemia - continue Simvastatin 20mg p.o.q.d. as directed - continue low cholesterol/low fat diet\par hypertriglyceridemia - continue low cholesterol/low fat and low carbohydrate/low sugar diet \par CAD - s/p stent (LAD) - recommended taking Aspirin 81mg p.o.q.o.d. as directed; continue Simvastatin 20mg p.o.q.d. as directed \par Continue to follow up with cardiologist, Dr. Cheung \par Endocrinology\par hyperglycemia (prediabetes) - continue low carbohydrate/low sugar diet \par Nephrology\par CKD - s/p kidney transplant in 2019 - continue Tacrolimus 1mg BID p.o.q.d. as directed (1mg in AM, 1mg in PM), CellCept 1000mg BID p.o.q.d. as directed, and Prednisone 5mg p.o.q.d. as directed - continue to follow up with transplant nephrologist at [facility] \par Endocrinology\par Continue Vitamin D 2000 IU p.o.q.d. with a meal as directed\par Musculoskeletal\par gait instability - Rx given for Three Prong/Quad Canes; Rx given for Bath Toilet Aids Tub Stool/Bench\par Gastroenterology\par Results of most recent screening colonoscopy to be requested from gastroenterologist, Dr. Jauregui's office\par constipation - continue Colace 100mg BID p.o. as directed \par history of esophageal reflux - previously recommended OTC Tums p.o. p.r.n. as directed - continue antireflux measures\par Urology\par BPH - continue Tamsulosin HCl 0.4mg p.o.q.h.s. as directed\par Immunization\par Shingrix - previously discussed, patient to determine if vaccine is covered under medical benefits of current insurance plan and follow up for 1st dose if interested; otherwise, instructed to follow up at the pharmacy for vaccine administration\par \par RTO in 3-4 months.

## 2022-12-01 ENCOUNTER — APPOINTMENT (OUTPATIENT)
Dept: INTERNAL MEDICINE | Facility: CLINIC | Age: 72
End: 2022-12-01

## 2022-12-01 VITALS
BODY MASS INDEX: 22.66 KG/M2 | TEMPERATURE: 98.6 F | RESPIRATION RATE: 15 BRPM | SYSTOLIC BLOOD PRESSURE: 130 MMHG | WEIGHT: 141 LBS | HEART RATE: 62 BPM | DIASTOLIC BLOOD PRESSURE: 70 MMHG | HEIGHT: 66 IN | OXYGEN SATURATION: 98 %

## 2022-12-01 PROCEDURE — 99214 OFFICE O/P EST MOD 30 MIN: CPT | Mod: 25

## 2022-12-01 NOTE — PHYSICAL EXAM
[No Acute Distress] : no acute distress [Well Nourished] : well nourished [Well Developed] : well developed [Well-Appearing] : well-appearing [Normal Sclera/Conjunctiva] : normal sclera/conjunctiva [PERRL] : pupils equal round and reactive to light [EOMI] : extraocular movements intact [Normal Outer Ear/Nose] : the outer ears and nose were normal in appearance [Normal Oropharynx] : the oropharynx was normal [No JVD] : no jugular venous distention [No Lymphadenopathy] : no lymphadenopathy [Supple] : supple [Thyroid Normal, No Nodules] : the thyroid was normal and there were no nodules present [No Respiratory Distress] : no respiratory distress  [No Accessory Muscle Use] : no accessory muscle use [Clear to Auscultation] : lungs were clear to auscultation bilaterally [Normal Rate] : normal rate  [Regular Rhythm] : with a regular rhythm [Normal S1, S2] : normal S1 and S2 [No Murmur] : no murmur heard [No Carotid Bruits] : no carotid bruits [No Abdominal Bruit] : a ~M bruit was not heard ~T in the abdomen [No Varicosities] : no varicosities [Pedal Pulses Present] : the pedal pulses are present [No Edema] : there was no peripheral edema [No Palpable Aorta] : no palpable aorta [No Extremity Clubbing/Cyanosis] : no extremity clubbing/cyanosis [Soft] : abdomen soft [Non Tender] : non-tender [Non-distended] : non-distended [No Masses] : no abdominal mass palpated [No HSM] : no HSM [Normal Bowel Sounds] : normal bowel sounds [Normal Posterior Cervical Nodes] : no posterior cervical lymphadenopathy [Normal Anterior Cervical Nodes] : no anterior cervical lymphadenopathy [No CVA Tenderness] : no CVA  tenderness [No Spinal Tenderness] : no spinal tenderness [No Joint Swelling] : no joint swelling [Grossly Normal Strength/Tone] : grossly normal strength/tone [No Rash] : no rash [Coordination Grossly Intact] : coordination grossly intact [No Focal Deficits] : no focal deficits [Normal Gait] : normal gait [Deep Tendon Reflexes (DTR)] : deep tendon reflexes were 2+ and symmetric [Normal Affect] : the affect was normal [Normal Insight/Judgement] : insight and judgment were intact [Normal Voice/Communication] : normal voice/communication [Normal TMs] : both tympanic membranes were normal [Normal Nasal Mucosa] : the nasal mucosa was normal [Normal Supraclavicular Nodes] : no supraclavicular lymphadenopathy [Kyphosis] : kyphosis [Acne] : no acne [Speech Grossly Normal] : speech grossly normal [Memory Grossly Normal] : memory grossly normal [Alert and Oriented x3] : oriented to person, place, and time [Normal Mood] : the mood was normal

## 2022-12-01 NOTE — HEALTH RISK ASSESSMENT
[Current] : Current [Yes] : Yes [Monthly or less (1 pt)] : Monthly or less (1 point) [1 or 2 (0 pts)] : 1 or 2 (0 points) [Never (0 pts)] : Never (0 points) [No] : In the past 12 months have you used drugs other than those required for medical reasons? No [No falls in past year] : Patient reported no falls in the past year [0] : 2) Feeling down, depressed, or hopeless: Not at all (0) [PHQ-2 Negative - No further assessment needed] : PHQ-2 Negative - No further assessment needed [Fully functional (bathing, dressing, toileting, transferring, walking, feeding)] : Fully functional (bathing, dressing, toileting, transferring, walking, feeding) [Fully functional (using the telephone, shopping, preparing meals, housekeeping, doing laundry, using] : Fully functional and needs no help or supervision to perform IADLs (using the telephone, shopping, preparing meals, housekeeping, doing laundry, using transportation, managing medications and managing finances) [de-identified] : Infrequent, usually 2-3 puffs of a cigarette.  [Audit-CScore] : 1 [de-identified] : Walking 3000 steps daily.  [[id number]] : 0 [ColonoscopyDate] : 05/17 [ColonoscopyComments] : Diverticulosis of sigmoid colon, multiple polyps, (sigmoid colon, descending colon, ascending colon, cecum), hemorrhoids; Results of most recent screening to be requested from Dr. Jauregui's office.

## 2022-12-01 NOTE — PLAN
[FreeTextEntry1] : Pulmonary\par occasional light smoker - previously discussed the importance of smoking cessation in reducing risk for lung cancer\par Cardiology\par hypertension - continue Labetalol HCl 300mg BID p.o.q.d. as directed, Nifedipine ER 30mg p.o. as directed, and Clonidine HCl 0.2mg BID p.o.q.d. as directed - advised low sodium diet; will continue to monitor BP\par hyperlipidemia - continue Simvastatin 20mg p.o.q.d. as directed - continue low cholesterol/low fat diet - check FLP and Hepatic Function Panel \par hypertriglyceridemia - continue low cholesterol/low fat and low carbohydrate/low sugar diet - check FLP\par CAD - s/p stent (LAD) - recommended taking Aspirin 81mg p.o.q.o.d. as directed; continue Simvastatin 20mg p.o.q.d. as directed \par Continue to follow up with cardiologist, Dr. Cheung \par Endocrinology\par hyperglycemia (prediabetes) - continue low carbohydrate/low sugar diet - check hemoglobin A1C\par history of Vitamin D insufficiency - continue Vitamin D 2000 IU p.o.q.d. with a meal as directed \par Nephrology\par CKD - s/p kidney transplant in 2019 - continue Tacrolimus 1mg BID p.o.q.d. as directed (1mg in AM, 1mg in PM), CellCept 1000mg BID p.o.q.d. as directed, and Prednisone 5mg p.o.q.d. as directed - continue to follow up with transplant nephrologist at [facility] \par Gastroenterology\par Results of most recent screening colonoscopy to be requested from gastroenterologist, Dr. Jauregui's office\par constipation - continue Colace 100mg BID p.o. as directed \par history of esophageal reflux - previously recommended OTC Tums p.o. p.r.n. as directed - continue antireflux measures\par Urology\par BPH - continue Tamsulosin HCl 0.4mg p.o.q.h.s. as directed\par Immunization\par Shingrix - previously discussed, patient to determine if vaccine is covered under medical benefits of current insurance plan and follow up for 1st dose if interested; otherwise, instructed to follow up at the pharmacy for vaccine administration\par \par Rx given for fasting blood work (Hepatic Function Panel, FLP, hemoglobin A1C); patient to follow up with results via email.

## 2022-12-01 NOTE — ASSESSMENT
[FreeTextEntry1] : Patient is a 72 year old male with a past medical history as above who presents for Rx for fasting blood work and general follow-up.

## 2022-12-01 NOTE — HISTORY OF PRESENT ILLNESS
[Spouse] : spouse [FreeTextEntry1] : Rx for fasting blood work and general follow-up [de-identified] : Patient is a 72 year old male with a past medical history as below who presents for Rx for fasting blood work and general follow-up. \par \par Patient states he is taking all medications as prescribed. He denies lightheadedness or dizziness on Labetalol, Nifedipine, and Clonidine. He denies any new arthralgias or myalgias on Simvastatin. Patient has noted easy bruising on Aspirin. \par \par Patient has blood work done every 2 weeks (ordered by transplant nephrologist); most recently on 11/29/22. \par \par Patient received the flu vaccine for this season on 11/2/22 at [facility]. Patient has received 4 doses of the COVID-19 Vaccine, but has not received the updated booster. He notes history of COVID-19 infection (September 2022).

## 2022-12-15 ENCOUNTER — LABORATORY RESULT (OUTPATIENT)
Age: 72
End: 2022-12-15

## 2022-12-16 ENCOUNTER — NON-APPOINTMENT (OUTPATIENT)
Age: 72
End: 2022-12-16

## 2023-03-14 NOTE — ED ADULT TRIAGE NOTE - AS O2 DELIVERY
Plan: Will recheck in 3 weeks Initiate Treatment: Hydrocortisone 2.5% cream BID to affected areas on face Detail Level: Zone Otc Regimen: Moisturizer daily for dryness and itch room air

## 2023-05-03 ENCOUNTER — APPOINTMENT (OUTPATIENT)
Dept: INTERNAL MEDICINE | Facility: CLINIC | Age: 73
End: 2023-05-03
Payer: MEDICARE

## 2023-05-03 VITALS
BODY MASS INDEX: 22.5 KG/M2 | TEMPERATURE: 98.4 F | HEART RATE: 80 BPM | SYSTOLIC BLOOD PRESSURE: 118 MMHG | WEIGHT: 140 LBS | RESPIRATION RATE: 16 BRPM | DIASTOLIC BLOOD PRESSURE: 76 MMHG | HEIGHT: 66 IN | OXYGEN SATURATION: 98 %

## 2023-05-03 DIAGNOSIS — Z79.899 OTHER LONG TERM (CURRENT) DRUG THERAPY: ICD-10-CM

## 2023-05-03 DIAGNOSIS — I25.10 ATHEROSCLEROTIC HEART DISEASE OF NATIVE CORONARY ARTERY W/OUT ANGINA PECTORIS: ICD-10-CM

## 2023-05-03 PROCEDURE — 99214 OFFICE O/P EST MOD 30 MIN: CPT

## 2023-07-13 NOTE — DATA REVIEWED
[No studies available for review at this time.] : No studies available for review at this time. [FreeTextEntry1] : Called cardio for recent consult \par reviewed echo repot 6/21 ef66% moderate AR Advancement Flap (Single) Text: The defect edges were debeveled with a #15 scalpel blade.  Given the location of the defect and the proximity to free margins a single advancement flap was deemed most appropriate.  Using a sterile surgical marker, an appropriate advancement flap was drawn incorporating the defect and placing the expected incisions within the relaxed skin tension lines where possible.    The area thus outlined was incised deep to adipose tissue with a #15 scalpel blade.  The skin margins were undermined to an appropriate distance in all directions utilizing iris scissors.

## 2023-11-06 ENCOUNTER — APPOINTMENT (OUTPATIENT)
Dept: INTERNAL MEDICINE | Facility: CLINIC | Age: 73
End: 2023-11-06
Payer: MEDICARE

## 2023-11-06 ENCOUNTER — MED ADMIN CHARGE (OUTPATIENT)
Age: 73
End: 2023-11-06

## 2023-11-06 VITALS
BODY MASS INDEX: 23.22 KG/M2 | TEMPERATURE: 96.3 F | OXYGEN SATURATION: 98 % | WEIGHT: 144.5 LBS | RESPIRATION RATE: 16 BRPM | HEART RATE: 66 BPM | SYSTOLIC BLOOD PRESSURE: 142 MMHG | HEIGHT: 66 IN | DIASTOLIC BLOOD PRESSURE: 68 MMHG

## 2023-11-06 VITALS — DIASTOLIC BLOOD PRESSURE: 68 MMHG | SYSTOLIC BLOOD PRESSURE: 136 MMHG

## 2023-11-06 DIAGNOSIS — N18.6 END STAGE RENAL DISEASE: ICD-10-CM

## 2023-11-06 DIAGNOSIS — Z86.79 PERSONAL HISTORY OF OTHER DISEASES OF THE CIRCULATORY SYSTEM: ICD-10-CM

## 2023-11-06 DIAGNOSIS — Z99.2 END STAGE RENAL DISEASE: ICD-10-CM

## 2023-11-06 DIAGNOSIS — Z23 ENCOUNTER FOR IMMUNIZATION: ICD-10-CM

## 2023-11-06 DIAGNOSIS — Z86.39 PERSONAL HISTORY OF OTHER ENDOCRINE, NUTRITIONAL AND METABOLIC DISEASE: ICD-10-CM

## 2023-11-06 PROCEDURE — G0008: CPT

## 2023-11-06 PROCEDURE — 99214 OFFICE O/P EST MOD 30 MIN: CPT | Mod: 25

## 2023-11-06 PROCEDURE — 90662 IIV NO PRSV INCREASED AG IM: CPT

## 2023-11-06 RX ORDER — MULTIVIT-MIN/FOLIC/VIT K/LYCOP 400-300MCG
25 MCG TABLET ORAL
Qty: 90 | Refills: 0 | Status: ACTIVE | COMMUNITY
Start: 2023-09-11

## 2023-11-06 RX ORDER — IBUPROFEN 800 MG/1
800 TABLET, FILM COATED ORAL
Qty: 18 | Refills: 0 | Status: COMPLETED | COMMUNITY
Start: 2023-06-29

## 2023-11-06 RX ORDER — NIFEDIPINE 30 MG/1
30 TABLET, FILM COATED, EXTENDED RELEASE ORAL
Qty: 90 | Refills: 0 | Status: COMPLETED | COMMUNITY
Start: 2022-11-18

## 2023-11-16 ENCOUNTER — LABORATORY RESULT (OUTPATIENT)
Age: 73
End: 2023-11-16

## 2023-11-16 ENCOUNTER — OUTPATIENT (OUTPATIENT)
Dept: OUTPATIENT SERVICES | Facility: HOSPITAL | Age: 73
LOS: 1 days | End: 2023-11-16
Payer: MEDICARE

## 2023-11-16 ENCOUNTER — APPOINTMENT (OUTPATIENT)
Dept: CT IMAGING | Facility: CLINIC | Age: 73
End: 2023-11-16
Payer: MEDICARE

## 2023-11-16 DIAGNOSIS — N18.9 CHRONIC KIDNEY DISEASE, UNSPECIFIED: ICD-10-CM

## 2023-11-16 DIAGNOSIS — F17.210 NICOTINE DEPENDENCE, CIGARETTES, UNCOMPLICATED: ICD-10-CM

## 2023-11-16 DIAGNOSIS — Z98.89 OTHER SPECIFIED POSTPROCEDURAL STATES: Chronic | ICD-10-CM

## 2023-11-16 PROCEDURE — 71250 CT THORAX DX C-: CPT | Mod: 26,MH

## 2023-11-16 PROCEDURE — 71250 CT THORAX DX C-: CPT

## 2024-02-05 ENCOUNTER — RESULT CHARGE (OUTPATIENT)
Age: 74
End: 2024-02-05

## 2024-02-06 ENCOUNTER — APPOINTMENT (OUTPATIENT)
Dept: INTERNAL MEDICINE | Facility: CLINIC | Age: 74
End: 2024-02-06
Payer: MEDICARE

## 2024-02-06 VITALS
HEIGHT: 66 IN | WEIGHT: 139.13 LBS | TEMPERATURE: 97.5 F | RESPIRATION RATE: 16 BRPM | SYSTOLIC BLOOD PRESSURE: 140 MMHG | DIASTOLIC BLOOD PRESSURE: 70 MMHG | OXYGEN SATURATION: 96 % | HEART RATE: 63 BPM | BODY MASS INDEX: 22.36 KG/M2

## 2024-02-06 DIAGNOSIS — R73.9 HYPERGLYCEMIA, UNSPECIFIED: ICD-10-CM

## 2024-02-06 DIAGNOSIS — I13.10 HYPERTENSIVE HEART AND CHRONIC KIDNEY DISEASE W/OUT HEART FAILURE, WITH STAGE 1 THROUGH STAGE 4 CHRONIC KIDNEY DISEASE, OR UNSPECIFIED CHRONIC KIDNEY DISEASE: ICD-10-CM

## 2024-02-06 DIAGNOSIS — I71.23 ANEURYSM OF THE DESCENDING THORACIC AORTA, WITHOUT RUPTURE: ICD-10-CM

## 2024-02-06 DIAGNOSIS — Z13.820 ENCOUNTER FOR SCREENING FOR OSTEOPOROSIS: ICD-10-CM

## 2024-02-06 PROCEDURE — G2211 COMPLEX E/M VISIT ADD ON: CPT

## 2024-02-06 PROCEDURE — 99214 OFFICE O/P EST MOD 30 MIN: CPT

## 2024-02-06 NOTE — HEALTH RISK ASSESSMENT
[Yes] : Yes [Monthly or less (1 pt)] : Monthly or less (1 point) [1 or 2 (0 pts)] : 1 or 2 (0 points) [Never (0 pts)] : Never (0 points) [No] : In the past 12 months have you used drugs other than those required for medical reasons? No [No falls in past year] : Patient reported no falls in the past year [0] : 2) Feeling down, depressed, or hopeless: Not at all (0) [PHQ-2 Negative - No further assessment needed] : PHQ-2 Negative - No further assessment needed [Current] : Current [de-identified] : gastroenterologist, Dr. Morejons,  Cardio Dr Cheung [Audit-CScore] : 1 [de-identified] : Walking 3000 steps daily.  [[id number]] : 0

## 2024-02-06 NOTE — PHYSICAL EXAM
[Well Nourished] : well nourished [Well Developed] : well developed [Well-Appearing] : well-appearing [Normal Sclera/Conjunctiva] : normal sclera/conjunctiva [PERRL] : pupils equal round and reactive to light [EOMI] : extraocular movements intact [Normal Outer Ear/Nose] : the outer ears and nose were normal in appearance [Normal Oropharynx] : the oropharynx was normal [No JVD] : no jugular venous distention [No Lymphadenopathy] : no lymphadenopathy [Supple] : supple [Thyroid Normal, No Nodules] : the thyroid was normal and there were no nodules present [No Respiratory Distress] : no respiratory distress  [No Accessory Muscle Use] : no accessory muscle use [Clear to Auscultation] : lungs were clear to auscultation bilaterally [Normal Rate] : normal rate  [Regular Rhythm] : with a regular rhythm [Normal S1, S2] : normal S1 and S2 [No Carotid Bruits] : no carotid bruits [No Abdominal Bruit] : a ~M bruit was not heard ~T in the abdomen [No Varicosities] : no varicosities [Pedal Pulses Present] : the pedal pulses are present [No Edema] : there was no peripheral edema [No Palpable Aorta] : no palpable aorta [No Extremity Clubbing/Cyanosis] : no extremity clubbing/cyanosis [Soft] : abdomen soft [Non Tender] : non-tender [Non-distended] : non-distended [No Masses] : no abdominal mass palpated [No HSM] : no HSM [Normal Bowel Sounds] : normal bowel sounds [Normal Posterior Cervical Nodes] : no posterior cervical lymphadenopathy [Normal Anterior Cervical Nodes] : no anterior cervical lymphadenopathy [No CVA Tenderness] : no CVA  tenderness [No Spinal Tenderness] : no spinal tenderness [No Joint Swelling] : no joint swelling [Grossly Normal Strength/Tone] : grossly normal strength/tone [No Rash] : no rash [Thin Hair] : thin hair [Coordination Grossly Intact] : coordination grossly intact [No Focal Deficits] : no focal deficits [Normal Gait] : normal gait [Deep Tendon Reflexes (DTR)] : deep tendon reflexes were 2+ and symmetric [Speech Grossly Normal] : speech grossly normal [Normal Affect] : the affect was normal [Alert and Oriented x3] : oriented to person, place, and time [Normal Mood] : the mood was normal [Normal Insight/Judgement] : insight and judgment were intact [de-identified] : wearing hearing aid  [de-identified] : with loud murmur  [de-identified] : fistula left upper ext

## 2024-02-06 NOTE — PLAN
[FreeTextEntry1] : Pulmonary occasional light smoker - previously discussed the importance of smoking cessation in reducing risk for lung cancer and other cancer like bladder CA.    Cardiology hypertension - continue Labetalol HCl 300mg BID p.o.q.d. as directed, Nifedipine ER 30mg p.o. as directed, and Clonidine HCl 0.2mg BID p.o.q.d. as directed - advised low sodium diet; will continue to monitor BP. Advised to follow up with cardio.  Rxn for fasting labs given to the pt.  Check CTA - get approval from transplant team.   hyperlipidemia - continue Simvastatin 20mg p.o.q.d. as directed - continue low cholesterol/low fat diet - check FLP and Hepatic Function Panel  hypertriglyceridemia - continue low cholesterol/low fat and low carbohydrate/low sugar diet - check FLP CAD - s/p stent (LAD) - recommended taking Aspirin 81mg p.o.q.o.d. as directed; continue Simvastatin 20mg p.o.q.d. as directed  Continue to follow up with cardiologist, Dr. Oracio Cheung - called office for consult report.  Advised the need to see cardio for repeat echo to re-evaluate AS  Endocrinology hyperglycemia (prediabetes) - continue low carbohydrate/low sugar diet - check hemoglobin A1C history of Vitamin D insufficiency - continue Vitamin D 2000 IU p.o.q.d. with a meal as directed  Nephrology CKD - s/p kidney transplant in 2019 - continue Tacrolimus 1mg BID p.o.q.d. as directed (1mg in AM, 1mg in PM), CellCept 1000mg BID p.o.q.d. as directed, and Prednisone 5mg p.o.q.d. as directed - continue to follow up with transplant nephrologist [facility].  Urology BPH - continue Tamsulosin HCl 0.4mg p.o.q.h.s. as directed. Immunization schedule to get RSV vax next week.   I spent 30 Minutes with the patient, half of which we discussed finding on physical exam and coordinated care.  As well as reviewed my plans and follow ups. Dragon speech recognition software was used to create portions of this document.  An attempt at proofreading has been made to minimize errors please call if any questions arise.   Rxn for labs and follow up with CTA

## 2024-02-06 NOTE — REVIEW OF SYSTEMS
[Fever] : no fever [Chills] : no chills [Fatigue] : no fatigue [Recent Change In Weight] : ~T no recent weight change [Discharge] : no discharge [Redness] : no redness [Vision Problems] : no vision problems [Itching] : no itching [Earache] : no earache [Hearing Loss] : no hearing loss [Nasal Discharge] : no nasal discharge [Sore Throat] : no sore throat [Hoarseness] : no hoarseness [Chest Pain] : no chest pain [Palpitations] : no palpitations [Lower Ext Edema] : no lower extremity edema [Shortness Of Breath] : no shortness of breath [Wheezing] : no wheezing [Dyspnea on Exertion] : not dyspnea on exertion [Abdominal Pain] : no abdominal pain [Nausea] : no nausea [Vomiting] : no vomiting [Heartburn] : no heartburn [Dysuria] : no dysuria [Incontinence] : no incontinence [Hematuria] : no hematuria [Frequency] : no frequency [Joint Pain] : no joint pain [Joint Stiffness] : no joint stiffness [Muscle Weakness] : no muscle weakness [Back Pain] : no back pain [Joint Swelling] : no joint swelling [Mole Changes] : no mole changes [Hair Changes] : no hair changes [Skin Rash] : no skin rash [Headache] : no headache [Dizziness] : no dizziness [Unsteady Walk] : no ataxia [Memory Loss] : no memory loss [Anxiety] : no anxiety [Easy Bleeding] : no easy bleeding [Easy Bruising] : no easy bruising [Swollen Glands] : no swollen glands [Negative] : Musculoskeletal [FreeTextEntry6] : smoker

## 2024-02-06 NOTE — COUNSELING
[Yes] : Risk of tobacco use and health benefits of smoking cessation discussed: Yes [Cessation strategies including cessation program discussed] : Cessation strategies including cessation program discussed [Encouraged to pick a quit date and identify support needed to quit] : Encouraged to pick a quit date and identify support needed to quit [No] : Not willing to quit smoking [Good understanding] : Patient has a good understanding of disease, goals and obesity follow-up plan [FreeTextEntry2] : Monitor daily weight  DASH diet  low fat diet

## 2024-02-06 NOTE — ASSESSMENT
[FreeTextEntry1] : Mr. MARINA is a 73 year male, with a past medical history as noted above, who present to the office today for general check up with no new medical concerns.

## 2024-02-06 NOTE — HISTORY OF PRESENT ILLNESS
[FreeTextEntry1] : Immunization update, general check up.   [de-identified] : Mr. MARINA is a 73 year- male, with a past medical history as noted below, who present to the office today for a general checkup.   Patient states he is following up with nephrologist weekly fs/p kidney transplant.  Denies any recent infection. Denies having chest pain, shortness of breath, nausea or vomiting.  Still continues to smoke cigarettes.  Denies going for CTA  chest as directed to.  Did see cardio.   Never had a bone density test.   States he going for his RSV shot next week.   has no medical concerns

## 2024-02-06 NOTE — DATA REVIEWED
[FreeTextEntry1] : Reviewed immunization records- reviewed prior labs  reviewed outstanding orders w/ pt called cardio for consult report

## 2024-03-11 LAB
CHOLEST SERPL-MCNC: 151
HBA1C MFR BLD HPLC: 5.7
HDLC SERPL-MCNC: 71
LDLC SERPL CALC-MCNC: 63
TRIGL SERPL-MCNC: 86

## 2024-04-08 ENCOUNTER — NON-APPOINTMENT (OUTPATIENT)
Age: 74
End: 2024-04-08

## 2024-04-08 ENCOUNTER — APPOINTMENT (OUTPATIENT)
Dept: INTERNAL MEDICINE | Facility: CLINIC | Age: 74
End: 2024-04-08
Payer: MEDICARE

## 2024-04-08 VITALS
SYSTOLIC BLOOD PRESSURE: 134 MMHG | RESPIRATION RATE: 16 BRPM | HEART RATE: 57 BPM | HEIGHT: 66 IN | OXYGEN SATURATION: 99 % | WEIGHT: 142.19 LBS | TEMPERATURE: 97.5 F | DIASTOLIC BLOOD PRESSURE: 60 MMHG | BODY MASS INDEX: 22.85 KG/M2

## 2024-04-08 DIAGNOSIS — K21.9 GASTRO-ESOPHAGEAL REFLUX DISEASE W/OUT ESOPHAGITIS: ICD-10-CM

## 2024-04-08 DIAGNOSIS — N18.9 CHRONIC KIDNEY DISEASE, UNSPECIFIED: ICD-10-CM

## 2024-04-08 DIAGNOSIS — I10 ESSENTIAL (PRIMARY) HYPERTENSION: ICD-10-CM

## 2024-04-08 DIAGNOSIS — H25.9 UNSPECIFIED AGE-RELATED CATARACT: ICD-10-CM

## 2024-04-08 DIAGNOSIS — Z01.818 ENCOUNTER FOR OTHER PREPROCEDURAL EXAMINATION: ICD-10-CM

## 2024-04-08 DIAGNOSIS — I25.10 ATHEROSCLEROTIC HEART DISEASE OF NATIVE CORONARY ARTERY W/OUT ANGINA PECTORIS: ICD-10-CM

## 2024-04-08 DIAGNOSIS — J44.9 CHRONIC OBSTRUCTIVE PULMONARY DISEASE, UNSPECIFIED: ICD-10-CM

## 2024-04-08 DIAGNOSIS — R26.89 OTHER ABNORMALITIES OF GAIT AND MOBILITY: ICD-10-CM

## 2024-04-08 DIAGNOSIS — Z94.0 KIDNEY TRANSPLANT STATUS: ICD-10-CM

## 2024-04-08 PROCEDURE — 93000 ELECTROCARDIOGRAM COMPLETE: CPT

## 2024-04-08 PROCEDURE — G2211 COMPLEX E/M VISIT ADD ON: CPT

## 2024-04-08 PROCEDURE — 99215 OFFICE O/P EST HI 40 MIN: CPT

## 2024-04-08 RX ORDER — PANTOPRAZOLE 40 MG/1
40 TABLET, DELAYED RELEASE ORAL
Qty: 90 | Refills: 0 | Status: ACTIVE | COMMUNITY
Start: 2024-04-08

## 2024-04-08 NOTE — HISTORY OF PRESENT ILLNESS
[Coronary Artery Disease] : coronary artery disease [COPD] : COPD [No Adverse Anesthesia Reaction] : no adverse anesthesia reaction in self or family member [Chronic Kidney Disease] : chronic kidney disease [(Patient denies any chest pain, claudication, dyspnea on exertion, orthopnea, palpitations or syncope)] : Patient denies any chest pain, claudication, dyspnea on exertion, orthopnea, palpitations or syncope [Smoker] : smoker [Aortic Stenosis] : no aortic stenosis [Atrial Fibrillation] : no atrial fibrillation [Recent Myocardial Infarction] : no recent myocardial infarction [Implantable Device/Pacemaker] : no implantable device/pacemaker [Asthma] : no asthma [Sleep Apnea] : no sleep apnea [Family Member] : no family member with adverse anesthesia reaction/sudden death [Self] : no previous adverse anesthesia reaction [Chronic Anticoagulation] : no chronic anticoagulation [Diabetes] : no diabetes [Moderate (4-6 METs)] : Moderate (4-6 METs) [FreeTextEntry1] : bilateral cataract surgery  [FreeTextEntry2] : 4/18/2024 [FreeTextEntry3] : Dr. Lozano  [FreeTextEntry4] : Patient is a 73-year-old male with a past medical history as below who presents for preoperative evaluation prior to bilateral cataract procedure. The procedure will be performed by ophthalmologist, Dr. Lozano, at [facility]. Patient has no history of allergy or adverse reaction to anesthesia. He can walk up 1-2 flights of stairs without dyspnea on exertion. He denies chest pain/pressure. He denies palpitations or SOB. Denies any history of excessive bleeding or bruising.

## 2024-04-08 NOTE — PHYSICAL EXAM
[No Acute Distress] : no acute distress [Well Nourished] : well nourished [Well Developed] : well developed [Well-Appearing] : well-appearing [Normal Sclera/Conjunctiva] : normal sclera/conjunctiva [PERRL] : pupils equal round and reactive to light [EOMI] : extraocular movements intact [Normal Outer Ear/Nose] : the outer ears and nose were normal in appearance [Normal Oropharynx] : the oropharynx was normal [No JVD] : no jugular venous distention [No Lymphadenopathy] : no lymphadenopathy [Supple] : supple [Thyroid Normal, No Nodules] : the thyroid was normal and there were no nodules present [No Respiratory Distress] : no respiratory distress  [No Accessory Muscle Use] : no accessory muscle use [Clear to Auscultation] : lungs were clear to auscultation bilaterally [Normal Rate] : normal rate  [Regular Rhythm] : with a regular rhythm [Normal S1, S2] : normal S1 and S2 [No Carotid Bruits] : no carotid bruits [No Abdominal Bruit] : a ~M bruit was not heard ~T in the abdomen [No Varicosities] : no varicosities [Pedal Pulses Present] : the pedal pulses are present [No Edema] : there was no peripheral edema [No Palpable Aorta] : no palpable aorta [No Extremity Clubbing/Cyanosis] : no extremity clubbing/cyanosis [Soft] : abdomen soft [Non Tender] : non-tender [Non-distended] : non-distended [No Masses] : no abdominal mass palpated [No HSM] : no HSM [Normal Bowel Sounds] : normal bowel sounds [Normal Posterior Cervical Nodes] : no posterior cervical lymphadenopathy [Normal Anterior Cervical Nodes] : no anterior cervical lymphadenopathy [No CVA Tenderness] : no CVA  tenderness [No Spinal Tenderness] : no spinal tenderness [No Joint Swelling] : no joint swelling [Grossly Normal Strength/Tone] : grossly normal strength/tone [No Rash] : no rash [Coordination Grossly Intact] : coordination grossly intact [No Focal Deficits] : no focal deficits [Normal Gait] : normal gait [Deep Tendon Reflexes (DTR)] : deep tendon reflexes were 2+ and symmetric [Normal Affect] : the affect was normal [Normal Insight/Judgement] : insight and judgment were intact [Normal] : affect was normal and insight and judgment were intact [Normal Voice/Communication] : normal voice/communication [Normal TMs] : both tympanic membranes were normal [Normal Nasal Mucosa] : the nasal mucosa was normal [Normal Supraclavicular Nodes] : no supraclavicular lymphadenopathy [Kyphosis] : no kyphosis [Scoliosis] : no scoliosis [Acne] : no acne [Speech Grossly Normal] : speech grossly normal [Memory Grossly Normal] : memory grossly normal [Alert and Oriented x3] : oriented to person, place, and time [Normal Mood] : the mood was normal [de-identified] : 3/6 systolic murmur at both sternal borders

## 2024-04-08 NOTE — PLAN
[FreeTextEntry1] : 1. Preoperative EKG performed in office - results are noted above.  2. The patient was instructed to stop eating prior to midnight the evening before the procedure.  3. Take all medications as prescribed prior to the procedure with sips of water. 4. There is no medical contraindication to the planned procedure and the patient is therefore medically optimized/cleared for the procedure.   Musculoskeletal gait instability - Rx given for ambulatory PT   Misc. Forms filled out for home health assistant to help with medication administration, picking up prescriptions, making physician appointments, transportation to and from doctor appointments, preparation of meals when wife is at work or not present.

## 2024-04-08 NOTE — ASSESSMENT
[Patient Optimized for Surgery] : Patient optimized for surgery [No Further Testing Recommended] : no further testing recommended [As per surgery] : as per surgery [FreeTextEntry4] : The patient is a 73-year-old male is an intermediate-risk surgical patient with adequate functional capacity going for a low-risk surgical procedure.

## 2024-04-08 NOTE — ADDENDUM
[FreeTextEntry1] : I, Lidiatania Teejen, acted solely as scribe for Dr. Reese Clayton DO on this date 4/8/2024.  All medical record entries made by the Scribe were at my, Dr. Reese Clayton DO direction and personally dictated by me on 4/8/2024. I have reviewed the chart and agree that the record accurately reflects my personal performance of the history, physical exam, assessment and plan. I have also personally directed, reviewed and agreed with the chart.

## 2024-04-08 NOTE — REVIEW OF SYSTEMS
[Negative] : Heme/Lymph [Unsteady Walk] : ataxia [Vision Problems] : vision problems [Muscle Weakness] : muscle weakness

## 2024-07-24 ENCOUNTER — APPOINTMENT (OUTPATIENT)
Dept: INTERNAL MEDICINE | Facility: CLINIC | Age: 74
End: 2024-07-24
Payer: MEDICARE

## 2024-07-24 VITALS
HEIGHT: 66 IN | BODY MASS INDEX: 22.68 KG/M2 | HEART RATE: 60 BPM | RESPIRATION RATE: 16 BRPM | TEMPERATURE: 98.1 F | OXYGEN SATURATION: 98 % | WEIGHT: 141.13 LBS | SYSTOLIC BLOOD PRESSURE: 136 MMHG | DIASTOLIC BLOOD PRESSURE: 50 MMHG

## 2024-07-24 DIAGNOSIS — N18.9 CHRONIC KIDNEY DISEASE, UNSPECIFIED: ICD-10-CM

## 2024-07-24 DIAGNOSIS — R73.9 HYPERGLYCEMIA, UNSPECIFIED: ICD-10-CM

## 2024-07-24 DIAGNOSIS — Z79.899 OTHER LONG TERM (CURRENT) DRUG THERAPY: ICD-10-CM

## 2024-07-24 DIAGNOSIS — Z86.39 PERSONAL HISTORY OF OTHER ENDOCRINE, NUTRITIONAL AND METABOLIC DISEASE: ICD-10-CM

## 2024-07-24 DIAGNOSIS — I25.10 ATHEROSCLEROTIC HEART DISEASE OF NATIVE CORONARY ARTERY W/OUT ANGINA PECTORIS: ICD-10-CM

## 2024-07-24 DIAGNOSIS — I10 ESSENTIAL (PRIMARY) HYPERTENSION: ICD-10-CM

## 2024-07-24 PROCEDURE — G2211 COMPLEX E/M VISIT ADD ON: CPT

## 2024-07-24 PROCEDURE — 99214 OFFICE O/P EST MOD 30 MIN: CPT

## 2024-07-24 NOTE — HISTORY OF PRESENT ILLNESS
[FreeTextEntry1] : general follow up [de-identified] : Mr. MARINA is a 73 year- male, with a past medical history as noted below, who present to the office today for a general checkup.   Patient states he is following up with nephrologist weekly fs/p kidney transplant.  Denies any recent infection. Denies having chest pain, shortness of breath, nausea or vomiting.  Still continues to smoke cigarettes.  Denies going for CTA  chest as directed to.  Did see cardio.   Never had a bone density test.   He had bilateral cataract surgery 5/2024. He notes left shoulder pain for which he saw orthopedics and had x ray demonstrating osteoarthritis with a bone spur.  He was given a cortisone injection 7/22/24. He had recent blood work performed by his transplant nephrologist, Dr. Ram. He has followed up with cardiologist, Dr. Cheung and had an echocardiogram in 2024. He has an upcoming appointment with vascular surgeon, Dr. Segura to dilate his left upper extremity fistula.

## 2024-07-24 NOTE — PLAN
[FreeTextEntry1] : Pulmonary occasional light smoker - previously discussed the importance of smoking cessation in reducing risk for lung cancer and other cancer like bladder CA.    Cardiology hypertension - continue Labetalol HCl 300mg BID p.o.q.d. as directed, Nifedipine ER 30mg p.o. as directed, and Clonidine HCl 0.2mg BID p.o.q.d. as directed - advised low sodium diet; will continue to monitor BP. Advised to follow up with cardio.   Check CTA - get approval from transplant team.   hyperlipidemia - continue Simvastatin 20mg p.o.q.d. as directed - continue low cholesterol/low fat diet  hypertriglyceridemia - continue low cholesterol/low fat and low carbohydrate/low sugar diet  CAD - s/p stent (LAD) - recommended taking Aspirin 81mg p.o.q.o.d. as directed; continue Simvastatin 20mg p.o.q.d. as directed  Continue to follow up with cardiologist, Dr. Oracio Cheung   Endocrinology hyperglycemia (prediabetes) - continue low carbohydrate/low sugar diet  history of Vitamin D insufficiency - continue Vitamin D 2000 IU p.o.q.d. with a meal as directed  Nephrology CKD - s/p kidney transplant in 2019 - continue Tacrolimus 1mg BID p.o.q.d. as directed (1mg in AM, 1mg in PM), CellCept 1000mg BID p.o.q.d. as directed, and Prednisone 5mg p.o.q.d. as directed - continue to follow up with transplant nephrologist at [facility].  Urology BPH - continue Tamsulosin HCl 0.4mg p.o.q.h.s. as directed.

## 2024-07-24 NOTE — COUNSELING
[Yes] : Risk of tobacco use and health benefits of smoking cessation discussed: Yes [No] : Not willing to quit smoking [Good understanding] : Patient has a good understanding of disease, goals and obesity follow-up plan [FreeTextEntry2] : Monitor daily weight  DASH diet  low fat diet

## 2024-07-24 NOTE — PHYSICAL EXAM
[Well Nourished] : well nourished [Well Developed] : well developed [Well-Appearing] : well-appearing [Normal Sclera/Conjunctiva] : normal sclera/conjunctiva [PERRL] : pupils equal round and reactive to light [EOMI] : extraocular movements intact [Normal Outer Ear/Nose] : the outer ears and nose were normal in appearance [Normal Oropharynx] : the oropharynx was normal [No JVD] : no jugular venous distention [No Lymphadenopathy] : no lymphadenopathy [Supple] : supple [Thyroid Normal, No Nodules] : the thyroid was normal and there were no nodules present [No Respiratory Distress] : no respiratory distress  [No Accessory Muscle Use] : no accessory muscle use [Clear to Auscultation] : lungs were clear to auscultation bilaterally [Normal Rate] : normal rate  [Regular Rhythm] : with a regular rhythm [Normal S1, S2] : normal S1 and S2 [No Carotid Bruits] : no carotid bruits [No Abdominal Bruit] : a ~M bruit was not heard ~T in the abdomen [No Varicosities] : no varicosities [Pedal Pulses Present] : the pedal pulses are present [No Edema] : there was no peripheral edema [No Palpable Aorta] : no palpable aorta [No Extremity Clubbing/Cyanosis] : no extremity clubbing/cyanosis [Soft] : abdomen soft [Non Tender] : non-tender [Non-distended] : non-distended [No Masses] : no abdominal mass palpated [No HSM] : no HSM [Normal Bowel Sounds] : normal bowel sounds [Normal Posterior Cervical Nodes] : no posterior cervical lymphadenopathy [Normal Anterior Cervical Nodes] : no anterior cervical lymphadenopathy [No CVA Tenderness] : no CVA  tenderness [No Spinal Tenderness] : no spinal tenderness [No Joint Swelling] : no joint swelling [Grossly Normal Strength/Tone] : grossly normal strength/tone [No Rash] : no rash [Thin Hair] : thin hair [Coordination Grossly Intact] : coordination grossly intact [No Focal Deficits] : no focal deficits [Normal Gait] : normal gait [Deep Tendon Reflexes (DTR)] : deep tendon reflexes were 2+ and symmetric [Speech Grossly Normal] : speech grossly normal [Normal Affect] : the affect was normal [Alert and Oriented x3] : oriented to person, place, and time [Normal Mood] : the mood was normal [Normal Insight/Judgement] : insight and judgment were intact [de-identified] : wearing hearing aid  [de-identified] : with loud murmur  [de-identified] : fistula left upper ext

## 2024-07-24 NOTE — REVIEW OF SYSTEMS
[Fever] : no fever [Chills] : no chills [Fatigue] : no fatigue [Recent Change In Weight] : ~T no recent weight change [Discharge] : no discharge [Redness] : no redness [Vision Problems] : no vision problems [Itching] : no itching [Earache] : no earache [Hearing Loss] : no hearing loss [Nasal Discharge] : no nasal discharge [Sore Throat] : no sore throat [Hoarseness] : no hoarseness [Chest Pain] : no chest pain [Palpitations] : no palpitations [Lower Ext Edema] : no lower extremity edema [Shortness Of Breath] : no shortness of breath [Wheezing] : no wheezing [Dyspnea on Exertion] : not dyspnea on exertion [Abdominal Pain] : no abdominal pain [Nausea] : no nausea [Vomiting] : no vomiting [Heartburn] : no heartburn [Dysuria] : no dysuria [Incontinence] : no incontinence [Hematuria] : no hematuria [Frequency] : no frequency [Joint Pain] : joint pain [Joint Stiffness] : joint stiffness [Muscle Pain] : no muscle pain [Muscle Weakness] : no muscle weakness [Back Pain] : no back pain [Joint Swelling] : no joint swelling [Mole Changes] : no mole changes [Hair Changes] : no hair changes [Skin Rash] : no skin rash [Headache] : no headache [Dizziness] : no dizziness [Unsteady Walk] : no ataxia [Memory Loss] : no memory loss [Anxiety] : no anxiety [Easy Bleeding] : no easy bleeding [Easy Bruising] : no easy bruising [Swollen Glands] : no swollen glands [Negative] : Heme/Lymph [FreeTextEntry6] : smoker

## 2024-07-24 NOTE — DATA REVIEWED
[FreeTextEntry1] : Reviewed immunization records- reviewed prior labs  reviewed outstanding orders w/ pt called nephrologist for recent blood results

## 2024-07-24 NOTE — HEALTH RISK ASSESSMENT
[Yes] : Yes [Monthly or less (1 pt)] : Monthly or less (1 point) [1 or 2 (0 pts)] : 1 or 2 (0 points) [Never (0 pts)] : Never (0 points) [No] : In the past 12 months have you used drugs other than those required for medical reasons? No [No falls in past year] : Patient reported no falls in the past year [0] : 2) Feeling down, depressed, or hopeless: Not at all (0) [PHQ-2 Negative - No further assessment needed] : PHQ-2 Negative - No further assessment needed [de-identified] : gastroenterologist, Dr. Morejons,  Cardio Dr Cheung [Audit-CScore] : 1 [de-identified] : Walking 3000 steps daily.  [[id number]] : 0 [Current] : Current

## 2024-07-24 NOTE — ASSESSMENT
[FreeTextEntry1] : Mr. MARINA is a 73 year male, with a past medical history as noted above, who present to the office today for general follow up of chronic medical issues.

## 2024-11-05 ENCOUNTER — APPOINTMENT (OUTPATIENT)
Dept: INTERNAL MEDICINE | Facility: CLINIC | Age: 74
End: 2024-11-05
Payer: MEDICARE

## 2024-11-05 VITALS
WEIGHT: 142 LBS | TEMPERATURE: 97.1 F | DIASTOLIC BLOOD PRESSURE: 72 MMHG | HEIGHT: 66 IN | BODY MASS INDEX: 22.82 KG/M2 | HEART RATE: 62 BPM | OXYGEN SATURATION: 97 % | SYSTOLIC BLOOD PRESSURE: 120 MMHG

## 2024-11-05 DIAGNOSIS — R26.81 UNSTEADINESS ON FEET: ICD-10-CM

## 2024-11-05 DIAGNOSIS — R26.89 OTHER ABNORMALITIES OF GAIT AND MOBILITY: ICD-10-CM

## 2024-11-05 DIAGNOSIS — Z94.0 KIDNEY TRANSPLANT STATUS: ICD-10-CM

## 2024-11-05 DIAGNOSIS — J06.9 ACUTE UPPER RESPIRATORY INFECTION, UNSPECIFIED: ICD-10-CM

## 2024-11-05 DIAGNOSIS — Z86.39 PERSONAL HISTORY OF OTHER ENDOCRINE, NUTRITIONAL AND METABOLIC DISEASE: ICD-10-CM

## 2024-11-05 DIAGNOSIS — I25.10 ATHEROSCLEROTIC HEART DISEASE OF NATIVE CORONARY ARTERY W/OUT ANGINA PECTORIS: ICD-10-CM

## 2024-11-05 DIAGNOSIS — I10 ESSENTIAL (PRIMARY) HYPERTENSION: ICD-10-CM

## 2024-11-05 DIAGNOSIS — J44.9 CHRONIC OBSTRUCTIVE PULMONARY DISEASE, UNSPECIFIED: ICD-10-CM

## 2024-11-05 DIAGNOSIS — N18.9 CHRONIC KIDNEY DISEASE, UNSPECIFIED: ICD-10-CM

## 2024-11-05 DIAGNOSIS — R73.9 HYPERGLYCEMIA, UNSPECIFIED: ICD-10-CM

## 2024-11-05 PROCEDURE — 99214 OFFICE O/P EST MOD 30 MIN: CPT

## 2024-11-05 PROCEDURE — G2211 COMPLEX E/M VISIT ADD ON: CPT

## 2024-12-17 ENCOUNTER — RX RENEWAL (OUTPATIENT)
Age: 74
End: 2024-12-17

## 2025-03-25 ENCOUNTER — RX RENEWAL (OUTPATIENT)
Age: 75
End: 2025-03-25

## 2025-05-05 ENCOUNTER — APPOINTMENT (OUTPATIENT)
Dept: INTERNAL MEDICINE | Facility: CLINIC | Age: 75
End: 2025-05-05

## 2025-06-16 ENCOUNTER — RX RENEWAL (OUTPATIENT)
Age: 75
End: 2025-06-16

## 2025-09-18 ENCOUNTER — RX RENEWAL (OUTPATIENT)
Age: 75
End: 2025-09-18